# Patient Record
Sex: MALE | Race: WHITE
[De-identification: names, ages, dates, MRNs, and addresses within clinical notes are randomized per-mention and may not be internally consistent; named-entity substitution may affect disease eponyms.]

---

## 2024-02-29 ENCOUNTER — HOSPITAL ENCOUNTER
Dept: HOSPITAL 101 - LAB | Age: 59
Discharge: HOME | End: 2024-02-29
Payer: COMMERCIAL

## 2024-02-29 DIAGNOSIS — Z01.812: Primary | ICD-10-CM

## 2024-02-29 DIAGNOSIS — J32.9: ICD-10-CM

## 2024-02-29 LAB
INR: 1.26
PARTIAL THROMBOPLASTIN TIME: 25.9 SEC (ref 22.3–36.2)
PROTHROMBIN TIME: 13.2 SEC (ref 9–11.6)

## 2024-02-29 PROCEDURE — 36415 COLL VENOUS BLD VENIPUNCTURE: CPT

## 2024-02-29 PROCEDURE — 85610 PROTHROMBIN TIME: CPT

## 2024-02-29 PROCEDURE — 85730 THROMBOPLASTIN TIME PARTIAL: CPT

## 2024-04-05 ENCOUNTER — OFFICE VISIT (OUTPATIENT)
Dept: OTOLARYNGOLOGY | Facility: CLINIC | Age: 59
End: 2024-04-05
Payer: COMMERCIAL

## 2024-04-05 VITALS — TEMPERATURE: 96.5 F | BODY MASS INDEX: 25.76 KG/M2 | WEIGHT: 194.4 LBS | HEIGHT: 73 IN

## 2024-04-05 DIAGNOSIS — J32.1 CHRONIC FRONTAL SINUSITIS: ICD-10-CM

## 2024-04-05 DIAGNOSIS — R09.81 NASAL CONGESTION WITH RHINORRHEA: ICD-10-CM

## 2024-04-05 DIAGNOSIS — J32.2 CHRONIC ETHMOIDAL SINUSITIS: Primary | ICD-10-CM

## 2024-04-05 DIAGNOSIS — J34.2 DEVIATED SEPTUM: ICD-10-CM

## 2024-04-05 DIAGNOSIS — J34.89 NASAL CONGESTION WITH RHINORRHEA: ICD-10-CM

## 2024-04-05 PROCEDURE — 31231 NASAL ENDOSCOPY DX: CPT | Performed by: OTOLARYNGOLOGY

## 2024-04-05 PROCEDURE — 99204 OFFICE O/P NEW MOD 45 MIN: CPT | Performed by: OTOLARYNGOLOGY

## 2024-04-05 RX ORDER — PROPRANOLOL HYDROCHLORIDE 20 MG/1
20 TABLET ORAL
COMMUNITY
Start: 2017-10-23

## 2024-04-05 RX ORDER — ACETAMINOPHEN 500 MG
TABLET ORAL
COMMUNITY

## 2024-04-05 RX ORDER — LISINOPRIL 20 MG/1
20 TABLET ORAL
COMMUNITY
Start: 2016-10-31

## 2024-04-05 RX ORDER — NIFEDIPINE 30 MG/1
30 TABLET, EXTENDED RELEASE ORAL
COMMUNITY
Start: 2016-11-07

## 2024-04-05 RX ORDER — SUMATRIPTAN SUCCINATE 100 MG/1
100 TABLET ORAL
COMMUNITY
Start: 2016-10-31

## 2024-04-05 RX ORDER — BROMPHENIRAMINE MALEATE, DEXTROMETHORPHAN HBR, PHENYLEPHRINE HCL, DIPHENHYDRAMINE HCL, PHENYLEPHRINE HCL 0.52G
KIT ORAL
COMMUNITY
Start: 2017-10-23

## 2024-04-05 ASSESSMENT — PATIENT HEALTH QUESTIONNAIRE - PHQ9
2. FEELING DOWN, DEPRESSED OR HOPELESS: NOT AT ALL
SUM OF ALL RESPONSES TO PHQ9 QUESTIONS 1 AND 2: 0
1. LITTLE INTEREST OR PLEASURE IN DOING THINGS: NOT AT ALL

## 2024-04-05 NOTE — PROGRESS NOTES
Chief Complaint:  Chronic sinusitis     History Of Present Illness:  Reason For Visit:     Dean presents in consultation through Dr. Jung for chronic sinusitis.    The Symptoms Are: intermittent and have been present for years, worse since July 2023.  In the last 12 months, the patient has had 1-2 sinus infections.  In the last 12 months, the patient has been treated with 3-4 antibiotic course(s).  The antibiotics have included Amoxicillin, Levaquin, and Augmentin (and 1-2 rounds of prednisone).     He has had sinus problems on and off as long as he can remember typically with 1 or 2 sinus infections per year in the winter months.  In July 2023 he started to have a significant exacerbation and he has been followed by both his primary care provider and Dr. Jung during this time.  He has been given multiple courses of oral antibiotic therapy as well as topical steroid therapy and oral steroid therapy.  Unfortunately, his symptoms have persisted despite these interventions.  CT imaging was obtained demonstrating findings within his paranasal sinuses.  As he was being worked up for surgery, Dr. 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  He does not feel that he bruises or bleeds particularly easily and he has had previous procedures in the past.  He was asked to follow-up with a different hematologist for further testing and this appointment is pending.    He has also started to have bilateral ear ringing worse since July and it seems to wax and wane getting worse when his nasal congestion progresses.    Main Symptoms:  Patient has anterior nasal drainage.     Patient has  posterior nasal drainage.   causes throat soreness   Patient has nasal airway obstruction.  bilateral, most bothersome symptom  Patient has  facial pain.   most bothersome symptom  Patient has  facial pressure.     Patient has decreased sense of smell. Decreased 30-40 % of normal.   Associated Symptoms:   Patient has  headaches.     Patient has throat clearing.    Patient has coughing.    Patient has dysphonia.   Patient does not have nasal bleeding.     Medications currently on for sinonasal symptoms: Saline irrigations as needed   Medications tried in the past for sinonasal symptoms:  Flonase (used consistently for 5 months)     Other Pertinent Medical Conditions:   Patient does not have asthma.    Patient does not have aspirin sensitivity.    Patient has migraines.   rarely   Patient does not have history of allergy testing.   Patient does not have history of sinus surgery.    Patient does not have history of nasal fracture.    Patient has heartburn.   mild  The patient does not take medical therapy for heartburn.   The patient has imaging of sinuses. When: 1/15/24    The remainder of a full 10 systems review of systems was pan negative outside of that stated in the history of present illness.    Active Problems:  Osteoarthritis, hypertension.     Past Medical History:  He has no past medical history on file.    Surgical History:  History reviewed. No pertinent surgical history.    Family History:  Mother: Heart Failure, Hypertension.  Father: Cancer, Hearing loss.  Brother: Heart Failure     Social History:  Tobacco Use: Low Risk  (4/5/2024)    Patient History     Smoking Tobacco Use: Never     Smokeless Tobacco Use: Never     Passive Exposure: Not on file     Allergies:  Allergies   Allergen Reactions    Quinolones Other and Myalgia     Other Reaction(s): tendon rupture     Current Meds:  Current Outpatient Medications:     cholecalciferol (Vitamin D-3) 5,000 Units tablet, , Disp: , Rfl:     lisinopril 20 mg tablet, 1 tablet (20 mg)., Disp: , Rfl:     multivit with minerals/lutein (MULTIVITAMIN 50 PLUS ORAL), , Disp: , Rfl:     NIFEdipine XL 30 mg 24 hr tablet, 1 tablet (30 mg)., Disp: , Rfl:     propranolol (Inderal)  "20 mg tablet, Take 1 tablet (20 mg) by mouth., Disp: , Rfl:     psyllium (Metamucil) 0.52 gram capsule, Psyllium Husk (Metamucil) 0.52 gram Capsule Active 1 CAP PO As Directed October 23rd, 2017 12:00am, Disp: , Rfl:     SUMAtriptan (Imitrex) 100 mg tablet, Take 1 tablet (100 mg) by mouth., Disp: , Rfl:     Visit Vitals  Temp 35.8 °C (96.5 °F)   Ht 1.854 m (6' 1\")   Wt 88.2 kg (194 lb 6.4 oz)   BMI 25.65 kg/m²   Smoking Status Never   BSA 2.13 m²   Respirations: 12    Physical Exam:  CONSTITUTIONAL:  Vitals reviewed in nursing chart, well developed, well nourished.    RESPIRATION:  Breathing comfortably, no stridor.  CV:  No clubbing/cyanosis/edema in hands.  EYES:  EOM Intact, sclera normal.  NEURO:  Alert and oriented times 3, Cranial nerves 2-12 intact and symmetric bilaterally.  HEAD AND FACE:  Skin with no masses or lesions, sinuses nontender to palpation.  SALIVARY GLANDS:  Parotid and submandibular glands normal bilaterally.  EARS:  Normal external ears, external auditory canals, and TMs to otoscopy, normal hearing to whispered voice.  NOSE:  External nose midline, anterior rhinoscopy is normal with limited visualization to the anterior aspect of the interior turbinates (see nasal endoscopy).  ORAL CAVITY/OROPHARYNX/LIPS:  Normal mucous membranes, normal floor of mouth/tongue/OP, no masses or lesions are noted.  PHARYNGEAL WALLS AND NASOPHARYNX:  No masses noted.  NECK/LYMPH:  No LAD, no thyroid masses.  LARYNX:  Could not direct visualize transorally.    SINONASAL ENDOSCOPY (CPT 54893): To better evaluate the patient's symptoms, sinonasal endoscopy is indicated.  After discussion of risks and benefits, and topical decongestion and anesthesia,an endoscope was used to perform nasal endoscopy on each side.  A time out identifying the patient, the procedure, the location of the procedure and any concerns was performed prior to beginning the procedure.    Findings:  Examination of the right nasal cavity revealed " a significant septal deviation at the level of the base of the middle turbinate.  There was significant impact on his nasal airway.  Middle meatus and sphenoethmoid recess were normal without pus or polyps.  Examination of the left nasal cavity revealed a normal middle meatus and sphenoethmoid recess without pus or polyps.  He has bilateral inferior turbinate hypertrophy.    Results/Data:  I personally reviewed the CT scan dated 1/15/24 with the patient today in clinic. It demonstrated inflammatory changes within a number of sinuses.    I reviewed documentation from his previous ENT and he had been prescribed amoxicillin, Augmentin, fluticasone, Levaquin, prednisone for the symptoms.    Provider Impressions:  1.  Chronic sinusitis   2.  Nasal airway obstruction, deviated nasal septum, bilateral inferior turbinate hypertrophy  3.  Rhinorrhea  4.  Facial pain and pressure, headaches  5.  Decreased sense of smell  6.  Throat clearing, coughing, dysphonia  7.  Tinnitus  8.  Factor VII deficiency    Discussion:  Dean Rueda and I discussed options.  He was comfortable considering surgical options today.  He presents with persistence of symptoms despite adequate medical therapy.  We discussed options including observation, continued medical therapy, or consideration of surgical intervention.  The sinus surgery itself was discussed at length.  The risks, benefits, and alternatives were explained in detail which include but is not limited to: persistence of symptoms, pain, bleeding, smell loss or alteration, infection, need for nasal packing, double vision, vision loss, CSF leak requiring other procedures to repair, numbness of teeth/and or face, need for revision surgery, and unexpected risks.      We discussed septoplasty itself including the risks, benefits and alternatives including but not limited to the following:  Numbness of teeth/and or face, alteration in sense of smell or taste, need for further surgery,  inability to correct nasal obstruction, bleeding, pain, infection, anesthetic risk, septal perforation and unexpected complications.     The risks of bilateral submucous inferior turbinate reductions were discussed and include bleeding in the short term and re-hypertrophy and recurrence of symptoms long-term.  There is a very low risk of scarring associated with this type of procedure.    We spent quite a bit of time today discussing his factor VII deficiency and that I would need an understanding from hematology if there is any medical therapy required to minimize his chances of bleeding either to be administered prior to the procedure, during the procedure, or after the procedure.  If he is not at elevated risk for bleeding I still think it would be in his best interest to have his procedure performed at the main campus.    Once he sees his new hematologist and there are recommendations available, I will schedule him at his convenience.  We did discuss some of his other symptoms such as tinnitus and there is no guarantee that these issues will improve with sinus surgery.  All questions were answered.    Patient Discussion/Summary:  Welcome to Dr. Liam Nowak's clinic. We are here to assist you with your ENT needs at [facility] [place]. Dr. Nowak is an ENT that specializes in nose, sinus, and skull base disorders.    Dr. Liam Nowak's office number is [phone]. Please call this number to contact his care team regardless of which office you use to access care. This number is the most direct way to communicate with all the members of the care team.    Oneyda Romeo CNP is a nurse practitioner who is a part of Dr. Nowak's team. She will work collaboratively with Dr. Nowak to meet your goals. This often may include seeing you for more urgent appointments or follow-up visits under Dr. Nowak's supervision.    Hyun Mejia RN BSN is Dr. Nowak's primary nurse. She can  be reached by calling [phone]. Hyun is available during business hours Tuesday through Friday. Oneyda Mace RN BSN is her rhinology nurse partner. Oneyda is available during business hours Monday through Thursday. Messages left for them will be returned within one business day. Hyun is also Dr. Nowak's surgery scheduler and will assist you with planning and scheduling of your surgery during her office hours.     Suzi Álvarez is Dr. Nowak's  and you can reach her at [phone]. She can help you with scheduling of appointments, general questions and information. She is available to receive calls Monday through Friday from 8:00 am until 4:25 pm.     For your convenience, Dr. Nowak sees patients at [facility] and [facility]. While we try to make your appointments as convenient as possible, occasionally a visit to another location may be necessary to provide the best care for you.    Dr. Nowak makes every effort to run on time for your appointments. Therefore, if you are more than 25 minutes late, your appointment will need to be rescheduled to another day. We appreciate your understanding.     We look forward to working with you to meet your healthcare goals.     Signature:  Scribe Attestation  By signing my name below, I, Viry Paige   attest that this documentation has been prepared under the direction and in the presence of Liam Nowak MD.

## 2024-04-05 NOTE — LETTER
April 7, 2024     Shanna Jung MD  [address] [address]    Patient: Dean Rueda   YOB: 1965   Date of Visit: 4/5/2024       Dear Dr. Shanna Jung MD:    Thank you for referring Dean Rueda to me for evaluation. Below are my notes for this consultation.  If you have questions, please do not hesitate to call me. I look forward to following your patient along with you.       Sincerely,     Liam Nowak MD      CC: No Recipients  ______________________________________________________________________________________    Chief Complaint:  Chronic sinusitis     History Of Present Illness:  Reason For Visit:     Dean presents in consultation through Dr. Jung for chronic sinusitis.    The Symptoms Are: intermittent and have been present for years, worse since July 2023.  In the last 12 months, the patient has had 1-2 sinus infections.  In the last 12 months, the patient has been treated with 3-4 antibiotic course(s).  The antibiotics have included Amoxicillin, Levaquin, and Augmentin (and 1-2 rounds of prednisone).     He has had sinus problems on and off as long as he can remember typically with 1 or 2 sinus infections per year in the winter months.  In July 2023 he started to have a significant exacerbation and he has been followed by both his primary care provider and Dr. Jung during this time.  He has been given multiple courses of oral antibiotic therapy as well as topical steroid therapy and oral steroid therapy.  Unfortunately, his symptoms have persisted despite these interventions.  CT imaging was obtained demonstrating findings within his paranasal sinuses.  As he was being worked up for surgery, Dr. 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  He does not feel that he bruises or bleeds particularly  easily and he has had previous procedures in the past.  He was asked to follow-up with a different hematologist for further testing and this appointment is pending.    He has also started to have bilateral ear ringing worse since July and it seems to wax and wane getting worse when his nasal congestion progresses.    Main Symptoms:  Patient has anterior nasal drainage.     Patient has  posterior nasal drainage.   causes throat soreness   Patient has nasal airway obstruction.  bilateral, most bothersome symptom  Patient has  facial pain.   most bothersome symptom  Patient has  facial pressure.    Patient has decreased sense of smell. Decreased 30-40 % of normal.   Associated Symptoms:   Patient has  headaches.     Patient has throat clearing.    Patient has coughing.    Patient has dysphonia.   Patient does not have nasal bleeding.     Medications currently on for sinonasal symptoms: Saline irrigations as needed   Medications tried in the past for sinonasal symptoms:  Flonase (used consistently for 5 months)     Other Pertinent Medical Conditions:   Patient does not have asthma.    Patient does not have aspirin sensitivity.    Patient has migraines.   rarely   Patient does not have history of allergy testing.   Patient does not have history of sinus surgery.    Patient does not have history of nasal fracture.    Patient has heartburn.   mild  The patient does not take medical therapy for heartburn.   The patient has imaging of sinuses. When: 1/15/24    The remainder of a full 10 systems review of systems was pan negative outside of that stated in the history of present illness.    Active Problems:  Osteoarthritis, hypertension.     Past Medical History:  He has no past medical history on file.    Surgical History:  History reviewed. No pertinent surgical history.    Family History:  Mother: Heart Failure, Hypertension.  Father: Cancer, Hearing loss.  Brother: Heart Failure     Social History:  Tobacco Use: Low Risk   "(4/5/2024)    Patient History    • Smoking Tobacco Use: Never    • Smokeless Tobacco Use: Never    • Passive Exposure: Not on file     Allergies:  Allergies   Allergen Reactions   • Quinolones Other and Myalgia     Other Reaction(s): tendon rupture     Current Meds:  Current Outpatient Medications:   •  cholecalciferol (Vitamin D-3) 5,000 Units tablet, , Disp: , Rfl:   •  lisinopril 20 mg tablet, 1 tablet (20 mg)., Disp: , Rfl:   •  multivit with minerals/lutein (MULTIVITAMIN 50 PLUS ORAL), , Disp: , Rfl:   •  NIFEdipine XL 30 mg 24 hr tablet, 1 tablet (30 mg)., Disp: , Rfl:   •  propranolol (Inderal) 20 mg tablet, Take 1 tablet (20 mg) by mouth., Disp: , Rfl:   •  psyllium (Metamucil) 0.52 gram capsule, Psyllium Husk (Metamucil) 0.52 gram Capsule Active 1 CAP PO As Directed October 23rd, 2017 12:00am, Disp: , Rfl:   •  SUMAtriptan (Imitrex) 100 mg tablet, Take 1 tablet (100 mg) by mouth., Disp: , Rfl:     Visit Vitals  Temp 35.8 °C (96.5 °F)   Ht 1.854 m (6' 1\")   Wt 88.2 kg (194 lb 6.4 oz)   BMI 25.65 kg/m²   Smoking Status Never   BSA 2.13 m²   Respirations: 12    Physical Exam:  CONSTITUTIONAL:  Vitals reviewed in nursing chart, well developed, well nourished.    RESPIRATION:  Breathing comfortably, no stridor.  CV:  No clubbing/cyanosis/edema in hands.  EYES:  EOM Intact, sclera normal.  NEURO:  Alert and oriented times 3, Cranial nerves 2-12 intact and symmetric bilaterally.  HEAD AND FACE:  Skin with no masses or lesions, sinuses nontender to palpation.  SALIVARY GLANDS:  Parotid and submandibular glands normal bilaterally.  EARS:  Normal external ears, external auditory canals, and TMs to otoscopy, normal hearing to whispered voice.  NOSE:  External nose midline, anterior rhinoscopy is normal with limited visualization to the anterior aspect of the interior turbinates (see nasal endoscopy).  ORAL CAVITY/OROPHARYNX/LIPS:  Normal mucous membranes, normal floor of mouth/tongue/OP, no masses or lesions are " noted.  PHARYNGEAL WALLS AND NASOPHARYNX:  No masses noted.  NECK/LYMPH:  No LAD, no thyroid masses.  LARYNX:  Could not direct visualize transorally.    SINONASAL ENDOSCOPY (CPT 89487): To better evaluate the patient's symptoms, sinonasal endoscopy is indicated.  After discussion of risks and benefits, and topical decongestion and anesthesia,an endoscope was used to perform nasal endoscopy on each side.  A time out identifying the patient, the procedure, the location of the procedure and any concerns was performed prior to beginning the procedure.    Findings:  Examination of the right nasal cavity revealed a significant septal deviation at the level of the base of the middle turbinate.  There was significant impact on his nasal airway.  Middle meatus and sphenoethmoid recess were normal without pus or polyps.  Examination of the left nasal cavity revealed a normal middle meatus and sphenoethmoid recess without pus or polyps.  He has bilateral inferior turbinate hypertrophy.    Results/Data:  I personally reviewed the CT scan dated 1/15/24 with the patient today in clinic. It demonstrated inflammatory changes within a number of sinuses.    I reviewed documentation from his previous ENT and he had been prescribed amoxicillin, Augmentin, fluticasone, Levaquin, prednisone for the symptoms.    Provider Impressions:  1.  Chronic sinusitis   2.  Nasal airway obstruction, deviated nasal septum, bilateral inferior turbinate hypertrophy  3.  Rhinorrhea  4.  Facial pain and pressure, headaches  5.  Decreased sense of smell  6.  Throat clearing, coughing, dysphonia  7.  Tinnitus  8.  Factor VII deficiency    Discussion:  Dean Rueda and I discussed options.  He was comfortable considering surgical options today.  He presents with persistence of symptoms despite adequate medical therapy.  We discussed options including observation, continued medical therapy, or consideration of surgical intervention.  The sinus surgery  itself was discussed at length.  The risks, benefits, and alternatives were explained in detail which include but is not limited to: persistence of symptoms, pain, bleeding, smell loss or alteration, infection, need for nasal packing, double vision, vision loss, CSF leak requiring other procedures to repair, numbness of teeth/and or face, need for revision surgery, and unexpected risks.      We discussed septoplasty itself including the risks, benefits and alternatives including but not limited to the following:  Numbness of teeth/and or face, alteration in sense of smell or taste, need for further surgery, inability to correct nasal obstruction, bleeding, pain, infection, anesthetic risk, septal perforation and unexpected complications.     The risks of bilateral submucous inferior turbinate reductions were discussed and include bleeding in the short term and re-hypertrophy and recurrence of symptoms long-term.  There is a very low risk of scarring associated with this type of procedure.    We spent quite a bit of time today discussing his factor VII deficiency and that I would need an understanding from hematology if there is any medical therapy required to minimize his chances of bleeding either to be administered prior to the procedure, during the procedure, or after the procedure.  If he is not at elevated risk for bleeding I still think it would be in his best interest to have his procedure performed at the main campus.    Once he sees his new hematologist and there are recommendations available, I will schedule him at his convenience.  We did discuss some of his other symptoms such as tinnitus and there is no guarantee that these issues will improve with sinus surgery.  All questions were answered.    Patient Discussion/Summary:  Welcome to Dr. Liam Nowak's clinic. We are here to assist you with your ENT needs at [facility] [place]. Dr. Nowak is an ENT that specializes in nose,  sinus, and skull base disorders.    Dr. Liam Nowak's office number is [phone]. Please call this number to contact his care team regardless of which office you use to access care. This number is the most direct way to communicate with all the members of the care team.    Oneyda Romeo CNP is a nurse practitioner who is a part of Dr. Nowak's team. She will work collaboratively with Dr. Nowak to meet your goals. This often may include seeing you for more urgent appointments or follow-up visits under Dr. Nowak's supervision.    Hyun Mejia RN BSN is Dr. Nowak's primary nurse. She can be reached by calling [phone]. Hyun is available during business hours Tuesday through Friday. Oneyda REIDN is her rhinology nurse partner. Oneyda is available during business hours Monday through Thursday. Messages left for them will be returned within one business day. Hyun is also Dr. Nowak's surgery scheduler and will assist you with planning and scheduling of your surgery during her office hours.     Suzi Álvarez is Dr. Nwoak's  and you can reach her at [phone]. She can help you with scheduling of appointments, general questions and information. She is available to receive calls Monday through Friday from 8:00 am until 4:25 pm.     For your convenience, Dr. Nowak sees patients at [facility] and [facility]. While we try to make your appointments as convenient as possible, occasionally a visit to another location may be necessary to provide the best care for you.    Dr. Nowak makes every effort to run on time for your appointments. Therefore, if you are more than 25 minutes late, your appointment will need to be rescheduled to another day. We appreciate your understanding.     We look forward to working with you to meet your healthcare goals.     Signature:  Scribe Attestation  By signing my name below, ICassandra  Viry French   attest that this documentation has been prepared under the direction and in the presence of Liam Nowak MD.

## 2024-04-05 NOTE — LETTER
April 7, 2024     Shanna Jung MD  [address] [address]    Patient: Dean Rueda   YOB: 1965   Date of Visit: 4/5/2024       Dear Dr. Shanna Jung MD:    Thank you for referring Dean Rueda to me for evaluation. Below are my notes for this consultation.  If you have questions, please do not hesitate to call me. I look forward to following your patient along with you.       Sincerely,     Liam Nowak MD      CC: No Recipients  ______________________________________________________________________________________    Chief Complaint:  Chronic sinusitis     History Of Present Illness:  Reason For Visit:     Dean presents in consultation through Dr. Jung for chronic sinusitis.    The Symptoms Are: intermittent and have been present for years, worse since July 2023.  In the last 12 months, the patient has had 1-2 sinus infections.  In the last 12 months, the patient has been treated with 3-4 antibiotic course(s).  The antibiotics have included Amoxicillin, Levaquin, and Augmentin (and 1-2 rounds of prednisone).     He has had sinus problems on and off as long as he can remember typically with 1 or 2 sinus infections per year in the winter months.  In July 2023 he started to have a significant exacerbation and he has been followed by both his primary care provider and Dr. Jung during this time.  He has been given multiple courses of oral antibiotic therapy as well as topical steroid therapy and oral steroid therapy.  Unfortunately, his symptoms have persisted despite these interventions.  CT imaging was obtained demonstrating findings within his paranasal sinuses.  As he was being worked up for surgery, Dr. 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  He does not feel that he bruises or bleeds particularly  easily and he has had previous procedures in the past.  He was asked to follow-up with a different hematologist for further testing and this appointment is pending.    He has also started to have bilateral ear ringing worse since July and it seems to wax and wane getting worse when his nasal congestion progresses.    Main Symptoms:  Patient has anterior nasal drainage.     Patient has  posterior nasal drainage.   causes throat soreness   Patient has nasal airway obstruction.  bilateral, most bothersome symptom  Patient has  facial pain.   most bothersome symptom  Patient has  facial pressure.    Patient has decreased sense of smell. Decreased 30-40 % of normal.   Associated Symptoms:   Patient has  headaches.     Patient has throat clearing.    Patient has coughing.    Patient has dysphonia.   Patient does not have nasal bleeding.     Medications currently on for sinonasal symptoms: Saline irrigations as needed   Medications tried in the past for sinonasal symptoms:  Flonase (used consistently for 5 months)     Other Pertinent Medical Conditions:   Patient does not have asthma.    Patient does not have aspirin sensitivity.    Patient has migraines.   rarely   Patient does not have history of allergy testing.   Patient does not have history of sinus surgery.    Patient does not have history of nasal fracture.    Patient has heartburn.   mild  The patient does not take medical therapy for heartburn.   The patient has imaging of sinuses. When: 1/15/24    The remainder of a full 10 systems review of systems was pan negative outside of that stated in the history of present illness.    Active Problems:  Osteoarthritis, hypertension.     Past Medical History:  He has no past medical history on file.    Surgical History:  History reviewed. No pertinent surgical history.    Family History:  Mother: Heart Failure, Hypertension.  Father: Cancer, Hearing loss.  Brother: Heart Failure     Social History:  Tobacco Use: Low Risk   "(4/5/2024)    Patient History    • Smoking Tobacco Use: Never    • Smokeless Tobacco Use: Never    • Passive Exposure: Not on file     Allergies:  Allergies   Allergen Reactions   • Quinolones Other and Myalgia     Other Reaction(s): tendon rupture     Current Meds:  Current Outpatient Medications:   •  cholecalciferol (Vitamin D-3) 5,000 Units tablet, , Disp: , Rfl:   •  lisinopril 20 mg tablet, 1 tablet (20 mg)., Disp: , Rfl:   •  multivit with minerals/lutein (MULTIVITAMIN 50 PLUS ORAL), , Disp: , Rfl:   •  NIFEdipine XL 30 mg 24 hr tablet, 1 tablet (30 mg)., Disp: , Rfl:   •  propranolol (Inderal) 20 mg tablet, Take 1 tablet (20 mg) by mouth., Disp: , Rfl:   •  psyllium (Metamucil) 0.52 gram capsule, Psyllium Husk (Metamucil) 0.52 gram Capsule Active 1 CAP PO As Directed October 23rd, 2017 12:00am, Disp: , Rfl:   •  SUMAtriptan (Imitrex) 100 mg tablet, Take 1 tablet (100 mg) by mouth., Disp: , Rfl:     Visit Vitals  Temp 35.8 °C (96.5 °F)   Ht 1.854 m (6' 1\")   Wt 88.2 kg (194 lb 6.4 oz)   BMI 25.65 kg/m²   Smoking Status Never   BSA 2.13 m²   Respirations: 12    Physical Exam:  CONSTITUTIONAL:  Vitals reviewed in nursing chart, well developed, well nourished.    RESPIRATION:  Breathing comfortably, no stridor.  CV:  No clubbing/cyanosis/edema in hands.  EYES:  EOM Intact, sclera normal.  NEURO:  Alert and oriented times 3, Cranial nerves 2-12 intact and symmetric bilaterally.  HEAD AND FACE:  Skin with no masses or lesions, sinuses nontender to palpation.  SALIVARY GLANDS:  Parotid and submandibular glands normal bilaterally.  EARS:  Normal external ears, external auditory canals, and TMs to otoscopy, normal hearing to whispered voice.  NOSE:  External nose midline, anterior rhinoscopy is normal with limited visualization to the anterior aspect of the interior turbinates (see nasal endoscopy).  ORAL CAVITY/OROPHARYNX/LIPS:  Normal mucous membranes, normal floor of mouth/tongue/OP, no masses or lesions are " noted.  PHARYNGEAL WALLS AND NASOPHARYNX:  No masses noted.  NECK/LYMPH:  No LAD, no thyroid masses.  LARYNX:  Could not direct visualize transorally.    SINONASAL ENDOSCOPY (CPT 63930): To better evaluate the patient's symptoms, sinonasal endoscopy is indicated.  After discussion of risks and benefits, and topical decongestion and anesthesia,an endoscope was used to perform nasal endoscopy on each side.  A time out identifying the patient, the procedure, the location of the procedure and any concerns was performed prior to beginning the procedure.    Findings:  Examination of the right nasal cavity revealed a significant septal deviation at the level of the base of the middle turbinate.  There was significant impact on his nasal airway.  Middle meatus and sphenoethmoid recess were normal without pus or polyps.  Examination of the left nasal cavity revealed a normal middle meatus and sphenoethmoid recess without pus or polyps.  He has bilateral inferior turbinate hypertrophy.    Results/Data:  I personally reviewed the CT scan dated 1/15/24 with the patient today in clinic. It demonstrated inflammatory changes within a number of sinuses.    I reviewed documentation from his previous ENT and he had been prescribed amoxicillin, Augmentin, fluticasone, Levaquin, prednisone for the symptoms.    Provider Impressions:  1.  Chronic sinusitis   2.  Nasal airway obstruction, deviated nasal septum, bilateral inferior turbinate hypertrophy  3.  Rhinorrhea  4.  Facial pain and pressure, headaches  5.  Decreased sense of smell  6.  Throat clearing, coughing, dysphonia  7.  Tinnitus  8.  Factor VII deficiency    Discussion:  Dean Rueda and I discussed options.  He was comfortable considering surgical options today.  He presents with persistence of symptoms despite adequate medical therapy.  We discussed options including observation, continued medical therapy, or consideration of surgical intervention.  The sinus surgery  itself was discussed at length.  The risks, benefits, and alternatives were explained in detail which include but is not limited to: persistence of symptoms, pain, bleeding, smell loss or alteration, infection, need for nasal packing, double vision, vision loss, CSF leak requiring other procedures to repair, numbness of teeth/and or face, need for revision surgery, and unexpected risks.      We discussed septoplasty itself including the risks, benefits and alternatives including but not limited to the following:  Numbness of teeth/and or face, alteration in sense of smell or taste, need for further surgery, inability to correct nasal obstruction, bleeding, pain, infection, anesthetic risk, septal perforation and unexpected complications.     The risks of bilateral submucous inferior turbinate reductions were discussed and include bleeding in the short term and re-hypertrophy and recurrence of symptoms long-term.  There is a very low risk of scarring associated with this type of procedure.    We spent quite a bit of time today discussing his factor VII deficiency and that I would need an understanding from hematology if there is any medical therapy required to minimize his chances of bleeding either to be administered prior to the procedure, during the procedure, or after the procedure.  If he is not at elevated risk for bleeding I still think it would be in his best interest to have his procedure performed at the main campus.    Once he sees his new hematologist and there are recommendations available, I will schedule him at his convenience.  We did discuss some of his other symptoms such as tinnitus and there is no guarantee that these issues will improve with sinus surgery.  All questions were answered.    Patient Discussion/Summary:  Welcome to Dr. Liam Nowak's clinic. We are here to assist you with your ENT needs at [facility] [place]. Dr. Nowak is an ENT that specializes in nose,  sinus, and skull base disorders.    Dr. Liam Nowak's office number is [phone]. Please call this number to contact his care team regardless of which office you use to access care. This number is the most direct way to communicate with all the members of the care team.    Oneyda Romeo CNP is a nurse practitioner who is a part of Dr. Nowak's team. She will work collaboratively with Dr. Nowak to meet your goals. This often may include seeing you for more urgent appointments or follow-up visits under Dr. Nowak's supervision.    Hyun Mejia RN BSN is Dr. Nowak's primary nurse. She can be reached by calling [phone]. Hyun is available during business hours Tuesday through Friday. Oneyda REIDN is her rhinology nurse partner. Oneyda is available during business hours Monday through Thursday. Messages left for them will be returned within one business day. Hyun is also Dr. Nowak's surgery scheduler and will assist you with planning and scheduling of your surgery during her office hours.     Suzi Álvarez is Dr. Nowak's  and you can reach her at [phone]. She can help you with scheduling of appointments, general questions and information. She is available to receive calls Monday through Friday from 8:00 am until 4:25 pm.     For your convenience, Dr. Nowak sees patients at [facility] and [facility] at [facility]. While we try to make your appointments as convenient as possible, occasionally a visit to another location may be necessary to provide the best care for you.    Dr. Nowak makes every effort to run on time for your appointments. Therefore, if you are more than 25 minutes late, your appointment will need to be rescheduled to another day. We appreciate your understanding.     We look forward to working with you to meet your healthcare goals.     Signature:  Scribe Attestation  By signing my name below, ICassandra  Viry French   attest that this documentation has been prepared under the direction and in the presence of Liam Nowak MD.

## 2024-04-05 NOTE — LETTER
April 5, 2024     Shanna Jung MD  [address] [address]    Patient: Dean Rueda   YOB: 1965   Date of Visit: 4/5/2024       Dear Dr. Shanna Jung MD:    Thank you for referring Dean Rueda to me for evaluation. Below are my notes for this consultation.  If you have questions, please do not hesitate to call me. I look forward to following your patient along with you.       Sincerely,     Liam Nowak MD      CC: No Recipients  ______________________________________________________________________________________    Chief Complaint:  Chronic sinusitis     History Of Present Illness:  Reason For Visit:   Patient presents to Otolaryngology Clinic in consultation at request of Dr. Shanna Jung for chronic sinusitis.    The Symptoms Are: intermittent and have been present for years, worse since July 2023.  In the last 12 months, the patient has had 1-2 sinus infections.  In the last 12 months, the patient has been treated with 3-4 antibiotic course(s).  The antibiotics have included Amoxicillin, Levaquin, and Augmentin (and 1-2 rounds of prednisone).     Dean Rueda presents to me as a new patient in consultation through Dr. Shanna Jung for chronic sinusitis.     He has had sinus problems off and on as long as he can remember with 1-2 sinus infections yearly typically in the winter months. In July of 2023, he started to have an obvious sinonasal exacerbation. He followed with his PCP and was given multiple rounds of antibiotics, his symptoms did not improve. He was referred to Dr. Jung who advised him to complete a month of antibiotics, he was also started on Flonase at that time. At his follow-up his symptoms persisted and a CT Scan was ordered which demonstrated chronic sinusitis.     He was scheduled for surgery through Dr. Jung, but was found to have an elevated prothrombin level. He followed with a hematologist at [facility] who ran extensive labs. 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 He note he's asymptomatic, he's never had an issue with bruising or bleeding. He spoke with his mother whom noted some family members. He has an appointment with hematology here in [place] in the near future.     He has started to have bilateral tinnitus since his exacerbation in July, worse when his congestion is more significant.     Main Symptoms:  Patient has anterior nasal drainage.     Patient has  posterior nasal drainage.   causes throat soreness   Patient has nasal airway obstruction.  bilateral, most bothersome symptom  Patient has  facial pain.   most bothersome symptom  Patient has  facial pressure.    Patient has decreased sense of smell. Decreased 30-40 % of normal.   Associated Symptoms:   Patient has  headaches.     Patient has throat clearing.    Patient has coughing.    Patient has dysphonia.   Patient does not have nasal bleeding.     Medications currently on for sinonasal symptoms: saline irrigations as needed   Medications tried in the past for sinonasal symptoms:  Flonase (used consistently for 5 months)     Other Pertinent Medical Conditions:   Patient does not have asthma.    Patient does not have aspirin sensitivity.    Patient has migraines.   rarely   Patient does not have history of allergy testing.   Patient does not have history of sinus surgery.    Patient does not have history of nasal fracture.    Patient has heartburn.   mild  The patient does not take medical therapy for heartburn.   The patient has imaging of sinuses. When: 1/15/24    Active Problems:  Osteoarthritis, hypertension.     Past Medical History:  He has no past medical history on file.    Surgical History:  History reviewed. No pertinent surgical history.    Family History:  Mother: Heart Failure, Hypertension.  Father: Cancer, Hearing loss.  Brother: Heart Failure     Social History:  Tobacco Use: Low Risk  (4/5/2024)     "Patient History    • Smoking Tobacco Use: Never    • Smokeless Tobacco Use: Never    • Passive Exposure: Not on file     Allergies:  Allergies   Allergen Reactions   • Quinolones Other and Myalgia     Other Reaction(s): tendon rupture     Current Meds:  Current Outpatient Medications:   •  cholecalciferol (Vitamin D-3) 5,000 Units tablet, , Disp: , Rfl:   •  lisinopril 20 mg tablet, 1 tablet (20 mg)., Disp: , Rfl:   •  multivit with minerals/lutein (MULTIVITAMIN 50 PLUS ORAL), , Disp: , Rfl:   •  NIFEdipine XL 30 mg 24 hr tablet, 1 tablet (30 mg)., Disp: , Rfl:   •  propranolol (Inderal) 20 mg tablet, Take 1 tablet (20 mg) by mouth., Disp: , Rfl:   •  psyllium (Metamucil) 0.52 gram capsule, Psyllium Husk (Metamucil) 0.52 gram Capsule Active 1 CAP PO As Directed October 23rd, 2017 12:00am, Disp: , Rfl:   •  SUMAtriptan (Imitrex) 100 mg tablet, Take 1 tablet (100 mg) by mouth., Disp: , Rfl:     Visit Vitals  Temp 35.8 °C (96.5 °F)   Ht 1.854 m (6' 1\")   Wt 88.2 kg (194 lb 6.4 oz)   BMI 25.65 kg/m²   Smoking Status Never   BSA 2.13 m²     Physical Exam:  CONSTITUTIONAL: Vitals reviewed in nursing chart, well developed, well nourished.   RESPIRATION: Breathing comfortably, no stridor.  CV: No clubbing/cyanosis/edema in hands.  EYES: EOM Intact, sclera normal.  NEURO: Alert and oriented times 3, Cranial nerves 2-12 intact and symmetric bilaterally.  HEAD AND FACE: Skin with no masses or lesions, sinuses nontender to palpation.  SALIVARY GLANDS: Parotid and submandibular glands normal bilaterally.  EARS: Normal external ears, external auditory canals, and TMs to otoscopy, normal hearing to whispered voice.  NOSE: External nose midline, anterior rhinoscopy is normal with limited visualization to the anterior aspect of the interior turbinates (see nasal endoscopy).  ORAL CAVITY/OROPHARYNX/LIPS: Normal mucous membranes, normal floor of mouth/tongue/OP, no masses or lesions are noted.  NECK/LYMPH: No LAD, no thyroid " masses.    SINONASAL ENDOSCOPY (CPT 83680): To better evaluate the patient's symptoms, sinonasal endoscopy is indicated.  After discussion of risks and benefits, and topical decongestion and anesthesia,an endoscope was used to perform nasal endoscopy on each side.  A time out identifying the patient, the procedure, the location of the procedure and any concerns was performed prior to beginning the procedure.    Findings:  Right: Significant septal deviation beginning at the base of the middle turbinate, no evidence of pus or polyps at MM or SER  Left: MM + SER normal  Bilateral inferior turbinate hypertrophy     Results/Data:  I personally reviewed the CT scan dated 1/15/24 with the patient today in clinic. It demonstrated ***.    4/10 sinus disease    Provider Impressions:  1. Chronic sinusitis   2. Rhinorrhea  3. Nasal airway obstruction  4. Facial pain / pressure, headaches, migraines   5. Decreased sense of smell   6. Throat clearing, coughing, dysphonia   7. Heartburn  8. Bilateral tinnitus  9. Factor 7 deficiency   10. Deviated nasal septum    Discussion: Dean Rueda and I discussed ***     Planning to follow with hematology, all hinges on that visit, advised to contact the clinic after he's had this visit   Will then plan to schedule visit at either satellite or main     He was amenable to this and all questions were answered.     Patient Discussion/Summary:  Welcome to Dr. Liam Nowak's clinic. We are here to assist you with your ENT needs at [facility] [place]. Dr. Nowak is an ENT that specializes in nose, sinus, and skull base disorders.    Dr. Liam Nowak's office number is [phone]. Please call this number to contact his care team regardless of which office you use to access care. This number is the most direct way to communicate with all the members of the care team.    Oneyda Romeo CNP is a nurse practitioner who is a part of Dr. Nowak's team. She will  work collaboratively with Dr. Nowak to meet your goals. This often may include seeing you for more urgent appointments or follow-up visits under Dr. Nowak's supervision.    Hyun Mejia RN BSN is Dr. Nowak's primary nurse. She can be reached by calling [phone]. Hyun is available during business hours Tuesday through Friday. Oneyda Mace RN BSN is her rhinology nurse partner. Oneyda is available during business hours Monday through Thursday. Messages left for them will be returned within one business day. Hyun is also Dr. Nowak's surgery scheduler and will assist you with planning and scheduling of your surgery during her office hours.     Suzi Álvarez is Dr. Nowak's  and you can reach her at [phone]. She can help you with scheduling of appointments, general questions and information. She is available to receive calls Monday through Friday from 8:00 am until 4:25 pm.     For your convenience, Dr. Nowak sees patients at [facility] and [facility] at [facility]. While we try to make your appointments as convenient as possible, occasionally a visit to another location may be necessary to provide the best care for you.    Dr. Nowak makes every effort to run on time for your appointments. Therefore, if you are more than 25 minutes late, your appointment will need to be rescheduled to another day. We appreciate your understanding.     We look forward to working with you to meet your healthcare goals.     Signature:  Scribe Attestation  By signing my name below, ICassandra Scribe   attest that this documentation has been prepared under the direction and in the presence of Liam Nowak MD.

## 2024-04-08 ENCOUNTER — TELEPHONE (OUTPATIENT)
Dept: HEMATOLOGY/ONCOLOGY | Facility: HOSPITAL | Age: 59
End: 2024-04-08
Payer: COMMERCIAL

## 2024-04-08 DIAGNOSIS — D68.2 FACTOR VII DEFICIENCY (MULTI): Primary | ICD-10-CM

## 2024-04-08 NOTE — TELEPHONE ENCOUNTER
RN received referral from Cely Chowdhury from [facility]. Referral is from Dr. Bryson for Factor VII deficiency. Patient also needs sinus surgery and will need to be evaluated prior to that surgery. Referral discussed with Dr. Oconnor. Scheduling orders entered.

## 2024-04-15 ENCOUNTER — PATIENT OUTREACH (OUTPATIENT)
Dept: HEMATOLOGY/ONCOLOGY | Facility: HOSPITAL | Age: 59
End: 2024-04-15
Payer: COMMERCIAL

## 2024-04-15 NOTE — PROGRESS NOTES
RN talked to patient. Patient was referred by a Dr. Bryson, Viki Stacy M.D in [place] for factor VII deficiency. Patient also needs sinus surgery and he needs surgical clearance for this. Dr. Liam Stanley is the ENT surgeon. Patient aware of date, time and place of appointment. Patient has phone number to call for questions or concerns. Call back number reviewed.

## 2024-04-16 ENCOUNTER — LAB (OUTPATIENT)
Dept: LAB | Facility: HOSPITAL | Age: 59
End: 2024-04-16
Payer: COMMERCIAL

## 2024-04-16 ENCOUNTER — OFFICE VISIT (OUTPATIENT)
Dept: HEMATOLOGY/ONCOLOGY | Facility: HOSPITAL | Age: 59
End: 2024-04-16
Payer: COMMERCIAL

## 2024-04-16 VITALS
OXYGEN SATURATION: 98 % | BODY MASS INDEX: 26.47 KG/M2 | WEIGHT: 189.1 LBS | SYSTOLIC BLOOD PRESSURE: 130 MMHG | HEART RATE: 61 BPM | DIASTOLIC BLOOD PRESSURE: 92 MMHG | HEIGHT: 71 IN | TEMPERATURE: 97.2 F | RESPIRATION RATE: 16 BRPM

## 2024-04-16 DIAGNOSIS — D68.9 COAGULATION DISORDER (MULTI): ICD-10-CM

## 2024-04-16 DIAGNOSIS — D68.2 FACTOR VII DEFICIENCY (MULTI): ICD-10-CM

## 2024-04-16 LAB
APTT PPP: 29 SECONDS (ref 27–38)
INR PPP: 1.3 (ref 0.9–1.1)
PROTHROMBIN TIME: 14.6 SECONDS (ref 9.8–12.8)

## 2024-04-16 PROCEDURE — 85610 PROTHROMBIN TIME: CPT

## 2024-04-16 PROCEDURE — 85230 CLOT FACTOR VII PROCONVERTIN: CPT

## 2024-04-16 PROCEDURE — 1036F TOBACCO NON-USER: CPT | Performed by: INTERNAL MEDICINE

## 2024-04-16 PROCEDURE — 36415 COLL VENOUS BLD VENIPUNCTURE: CPT

## 2024-04-16 PROCEDURE — 99215 OFFICE O/P EST HI 40 MIN: CPT | Performed by: INTERNAL MEDICINE

## 2024-04-16 PROCEDURE — 99205 OFFICE O/P NEW HI 60 MIN: CPT | Performed by: INTERNAL MEDICINE

## 2024-04-16 ASSESSMENT — COLUMBIA-SUICIDE SEVERITY RATING SCALE - C-SSRS
6. HAVE YOU EVER DONE ANYTHING, STARTED TO DO ANYTHING, OR PREPARED TO DO ANYTHING TO END YOUR LIFE?: NO
1. IN THE PAST MONTH, HAVE YOU WISHED YOU WERE DEAD OR WISHED YOU COULD GO TO SLEEP AND NOT WAKE UP?: NO
2. HAVE YOU ACTUALLY HAD ANY THOUGHTS OF KILLING YOURSELF?: NO

## 2024-04-16 ASSESSMENT — PATIENT HEALTH QUESTIONNAIRE - PHQ9
1. LITTLE INTEREST OR PLEASURE IN DOING THINGS: NOT AT ALL
2. FEELING DOWN, DEPRESSED OR HOPELESS: NOT AT ALL
SUM OF ALL RESPONSES TO PHQ9 QUESTIONS 1 AND 2: 0

## 2024-04-16 ASSESSMENT — ENCOUNTER SYMPTOMS: DEPRESSION: 0

## 2024-04-16 ASSESSMENT — PAIN SCALES - GENERAL: PAINLEVEL: 4

## 2024-04-16 NOTE — PROGRESS NOTES
Hematology office visit      Reason for visit : Factor VII deficiency     ENT : Dr. Liam Nowak -    Hematologist : Dr. Bryson - [facility]     HPI:     Referred by Dr. Charlie Rader from [place] for a factor VII deficiency disorder. He has medical history significant for chronic osteoarthritis , migraines, Gilbert's syndrome and chronic sinusitis . Had been evaluated by ENT Dr. Jung and was recommended ethmoidectomy with septoplasty . Preop labs showed isolated elevated PT prompting referral to Hematology .   He was seen by Dr. Gopi Rader at [facility] and had repaet etsting with factor VII levels which returned low at 28% and hence hje was referred to Dr. Oconnor for periop factor reaplcement . ENT urgeon also preferred that his surghery takes place  at [facility] so he was seen by Dr. Liam Nowak  who wanted periop recommendation for factor replacement .     As far as his bleeding history , he has never had any  issues with bleeding or bruising spontaneously . He had had two laceration in his hands needing suturing ad also lip laceration , all of these were uneventful. He has had wisdom teeth extraction and fissurectomy and anal sphincterotomy also with no bleeding. Unsure of bleeding history in family . Dad may have had some bleeding internally was anaemia , maternal uncle  great grandmother also with some bleeding disorder  undiagnosed . His son has no bleeding issues either .     He wondered if this disorder was congenital or acquired . He also wondered why he had no signs of bleeding if he was deficient. We briefly discussed how there are compensatory mechanisms in our body to prevent bleeding and hat Factor VII is only one of the essential clotting factors and if rest are normal he may not have any bleeding manifestations . As far as replacement therapy , that would depend on type of surgery and how much is factor activity is  . We discussed repeating all the testing  here today to document his factor VII activity level       LABS FROM OSH     Initial - PT 13.1 , aPTT 25.9   Repeat PT- 15.2 , mixing study corrected to 11.2 & then  10.0 . Fibrinogen 230, Factor X 128 , Factor VII - 28, Factor IX 10-2     Social History     Socioeconomic History    Marital status: Unknown     Spouse name: Not on file    Number of children: Not on file    Years of education: Not on file    Highest education level: Not on file   Occupational History    Not on file   Tobacco Use    Smoking status: Never    Smokeless tobacco: Never   Substance and Sexual Activity    Alcohol use: Not on file    Drug use: Not on file    Sexual activity: Not on file   Other Topics Concern    Not on file   Social History Narrative    Not on file     Social Determinants of Health     Financial Resource Strain: Not on file   Food Insecurity: Not on file   Transportation Needs: Not on file   Physical Activity: Not on file   Stress: Not on file   Social Connections: Not on file   Intimate Partner Violence: Not on file   Housing Stability: Not on file         Allergies   Allergen Reactions    Quinolones Other and Myalgia     Other Reaction(s): tendon rupture       Medications  Current Outpatient Medications   Medication Sig Dispense Refill    cholecalciferol (Vitamin D-3) 5,000 Units tablet       lisinopril 20 mg tablet 1 tablet (20 mg).      multivit with minerals/lutein (MULTIVITAMIN 50 PLUS ORAL)       NIFEdipine XL 30 mg 24 hr tablet 1 tablet (30 mg).      propranolol (Inderal) 20 mg tablet Take 1 tablet (20 mg) by mouth.      psyllium (Metamucil) 0.52 gram capsule Psyllium Husk (Metamucil) 0.52 gram Capsule Active 1 CAP PO As Directed October 23rd, 2017 12:00am      SUMAtriptan (Imitrex) 100 mg tablet Take 1 tablet (100 mg) by mouth.       No current facility-administered medications for this visit.         Review of Systems    Constitutional: Negative for fever, chills, anorexia, weight loss, malaise     ENMT: Negative  for nasal discharge, congestion, ear pain, mouth pain, throat pain, bleeding      Respiratory: Negative for cough, hemoptysis, wheezing, shortness of breath     Cardiac: Negative for chest pain, dyspnea on exertion, orthopnea, palpitations, syncope     Gastrointestinal: Negative for nausea, vomiting, diarrhea, constipation, abdominal pain,     Genitourinary: Negative for discharge, dysuria, flank pain, frequency, hematuria     Musculoskeletal: Negative for decreased ROM, pain, swelling, stiffness     Neurological: Negative for dizziness, confusion, headache, seizures, syncope, weakness     Psychiatric: Negative for mood changes, anxiety, hallucinations, sleep changes, suicidal ideas     Skin: Negative for itching, rash, ulcer     Endocrine: Negative for heat intolerance, cold intolerance, excessive sweating, polyuria, excess thirst     Hematologic/Lymph: Negative for anemia, bruising, easy bleeding, night sweats, petechiae, history of DVT/PE or cancer     Allergic/Immunologic: Negative for anaphylaxis, itchy/ teary eyes, itching, sneezing, swelling     Physical Exam:  Vitals:    04/16/24 1108   BP: (!) 130/92   Pulse: 61   Resp: 16   Temp: 36.2 °C (97.2 °F)   SpO2: 98%        General: Comfortable , no acute distress  Eyes: No conjunctival pallor / no scleral icterus   ENT: Oropharynx normal. No bleeding, petechiae   Cardiovascular: Regular rate & rhythm , S1/S2, no murmurs, peripheral  pulses +2, no edema of extremities  Pulmonary: CTAB, no respiratory distress. No wheezes, rales, or ronchi  Abdomen: +BS, soft, non-tender, no hepatosplenomegaly   MSK: No joint swelling, normal movements of all extremities. Range of motion- normal.  Extremities: No wounds, or contusions  Lymphatics: No cervical, axillary , epitrochlear or inguinal  lymphadenopathy   Skin- No rash, petechia or ecchymosis   Neuro: Alert/oriented x3, no focal motor or sensory deficits  Psychiatric: Judgment intact. Appropriate mood and  behavior      Assessment/Plan:    Dean Rueda is a 58 y.o. male with pmhx of  chronic sinusitis  presenting for  preop evaluation for isolated prolonged PT & Factor VII deficiency     04/16/24   Isolated prolonged PT is indicative of a factor VII deficiency .     It is unclear if he has inherited or acquired Factor VII as no clear documented family history and this is an Autosomal recessive  disorder .  Factor VII deficiency can presents with varying degrees of clinical severity  , however  bleeding seldom occurs as long as Factor VII activity is > 10% .  Replacement of factor is usually unnecessary unless bleeding is severe . We can use recombinant Factor VII , rVIIa or if that is unavailable aPCC is an alternate . Since factor VII has very short half life , large and frequent FFP administration may be needed causing volume overload .   Minor surgeries and day surgery may still be possible as long  as Factor levels > 10% without any need for replacement  . If there si concern for severe bleed prefer to use rVIIa   If there is concern for bleeding topical tranexamic acid may be used or systemic oral TXA 1300 mg po tid for 3 days can be given post op.     PLAN   Coag screen and factor VII today   If Factor VII  level > 10%  and depending on the bleeding phenotype, can proceed with surgery without need for replacement. Given that he has no bleeding issues, he should be able to undergo this surgery without needing factor replacement  We will communicate with his ENT surgeon Dr. Nowak and he mu be able to follow up with local hematologist for future needs   Follow up in  2 weeks with phone visit to review labs and make a pan     Seen and staffed with Dr. Oconnor  who is in agreement with the plan .    Dr. Stephy Cedillo MD FACP  PGY-6,  Hematology & Oncology Fellow   [facility]  [id number] [place] [id number] [phone]

## 2024-04-17 LAB — FACT VII ACT/NOR PPP: 31 % (ref 50–150)

## 2024-04-24 DIAGNOSIS — J32.2 CHRONIC ETHMOIDAL SINUSITIS: ICD-10-CM

## 2024-04-24 DIAGNOSIS — J34.2 DEVIATED NASAL SEPTUM: ICD-10-CM

## 2024-04-30 ENCOUNTER — TELEMEDICINE (OUTPATIENT)
Dept: HEMATOLOGY/ONCOLOGY | Facility: HOSPITAL | Age: 59
End: 2024-04-30
Payer: COMMERCIAL

## 2024-04-30 ENCOUNTER — TELEMEDICINE CLINICAL SUPPORT (OUTPATIENT)
Dept: PREADMISSION TESTING | Facility: HOSPITAL | Age: 59
End: 2024-04-30
Payer: COMMERCIAL

## 2024-04-30 DIAGNOSIS — D68.2 FACTOR VII DEFICIENCY (MULTI): ICD-10-CM

## 2024-04-30 DIAGNOSIS — D68.9 COAGULATION DISORDER (MULTI): ICD-10-CM

## 2024-04-30 PROCEDURE — 1036F TOBACCO NON-USER: CPT | Performed by: INTERNAL MEDICINE

## 2024-04-30 PROCEDURE — 99214 OFFICE O/P EST MOD 30 MIN: CPT | Performed by: INTERNAL MEDICINE

## 2024-04-30 RX ORDER — FERROUS SULFATE 325(65) MG
325 TABLET ORAL
COMMUNITY

## 2024-04-30 ASSESSMENT — ENCOUNTER SYMPTOMS
NEUROLOGICAL NEGATIVE: 1
CARDIOVASCULAR NEGATIVE: 1
MYALGIAS: 1
SINUS CONGESTION: 1
NECK NEGATIVE: 1
LIMITED RANGE OF MOTION: 1
RESPIRATORY NEGATIVE: 1
GASTROINTESTINAL NEGATIVE: 1
ENDOCRINE NEGATIVE: 1
EYES NEGATIVE: 1
RHINORRHEA: 1
CONSTITUTIONAL NEGATIVE: 1

## 2024-04-30 ASSESSMENT — DUKE ACTIVITY SCORE INDEX (DASI)
CAN YOU PARTICIPATE IN STRENOUS SPORTS LIKE SWIMMING, SINGLES TENNIS, FOOTBALL, BASKETBALL, OR SKIING: YES
CAN YOU WALK INDOORS, SUCH AS AROUND YOUR HOUSE: YES
CAN YOU DO YARD WORK LIKE RAKING LEAVES, WEEDING OR PUSHING A MOWER: YES
CAN YOU DO MODERATE WORK AROUND THE HOUSE LIKE VACUUMING, SWEEPING FLOORS OR CARRYING GROCERIES: YES
CAN YOU CLIMB A FLIGHT OF STAIRS OR WALK UP A HILL: YES
CAN YOU HAVE SEXUAL RELATIONS: NO
DASI METS SCORE: 9.2
CAN YOU PARTICIPATE IN MODERATE RECREATIONAL ACTIVITIES LIKE GOLF, BOWLING, DANCING, DOUBLES TENNIS OR THROWING A BASEBALL OR FOOTBALL: YES
TOTAL_SCORE: 52.95
CAN YOU DO LIGHT WORK AROUND THE HOUSE LIKE DUSTING OR WASHING DISHES: YES
CAN YOU DO HEAVY WORK AROUND THE HOUSE LIKE SCRUBBING FLOORS OR LIFTING AND MOVING HEAVY FURNITURE: YES
CAN YOU RUN A SHORT DISTANCE: YES
CAN YOU TAKE CARE OF YOURSELF (EAT, DRESS, BATHE, OR USE TOILET): YES
CAN YOU WALK A BLOCK OR TWO ON LEVEL GROUND: YES

## 2024-04-30 NOTE — CPM/PAT NURSE NOTE
CPM/PAT Nurse Note      Name: Dean Rueda (Dean Rueda)  /Age: [birth year]//58 y.o.       Bilateral endoscopic sinus surgery,septoplasty, bilateral submucosal inferior turbinate reductions. Liam Nowak MD on 5/10/2024  PCP Shemar Briones ([facility]) 3/27  Hem Onc Tia Oconnor ()  Given that he has no bleeding issues, he should be able to undergo this surgery without needing factor replacement  We will communicate with his ENT surgeon Dr. Nowak and he may be able to follow up with local hematologist for future needs   Telemedicine 24 No official note in AEM[facility] at time of phone screening. Please review at PAT date on 24  Dr Gil ([facility]) referral to Dr Oconnor    Rotator cuff deficiency bilaterally with limited ROM    Factor 7 Activity         Component  Ref Range & Units 2 wk ago   Factor VII Activity  50 - 150 % 31 Low         Coagulation Screen        Component  Ref Range & Units 2 wk ago   Protime  9.8 - 12.8 seconds 14.6 High    INR  0.9 - 1.1 1.3 High    aPTT  27 - 38 seconds 29              Past Medical History:   Diagnosis Date    Arthritis     BMI 26.0-26.9,adult     Chronic ethmoidal sinusitis     Chronic headaches     Clotting disorder (Multi)     Coagulation disorder (Multi)     Deviated nasal septum     Factor deficiency, coagulation (Multi)     VII    Gilbert's syndrome     Pre-procedure lab exam     PT 15.2  Factor VII 28    Rotator cuff arthropathy of both shoulders        Past Surgical History:   Procedure Laterality Date    ANAL SPHINCTEROTOMY  2015    fissurectomy second time 2016    CARPAL TUNNEL RELEASE Bilateral 2004    CATARACT EXTRACTION Bilateral 2020    COLONOSCOPY      RETINAL DETACHMENT SURGERY Bilateral 2019    vitreous       Patient  reports that he is not currently sexually active.    Family History   Problem Relation Name Age of Onset    Hyperlipidemia Mother      Hypertension Mother      Heart disease Mother      Irritable bowel syndrome  Mother      Other (Pacemaker) Mother      Hyperlipidemia Father      Colon cancer Father      Melanoma Father      Anemia Father      Other (fissurectomy) Sister      Osteoarthritis Sister      Hyperlipidemia Brother      Hypertension Brother      Heart disease Brother      Other (AAA) Brother         Allergies   Allergen Reactions    Quinolones Other and Myalgia     Other Reaction(s): tendon        Prior to Admission medications    Medication Sig Start Date End Date Taking? Authorizing Provider   cholecalciferol (Vitamin D-3) 5,000 Units tablet    Yes Historical Provider, MD   lisinopril 20 mg tablet 1 tablet (20 mg). 10/31/16  Yes Historical Provider, MD   multivit with minerals/lutein (MULTIVITAMIN 50 PLUS ORAL)    Yes Historical Provider, MD   NIFEdipine XL 30 mg 24 hr tablet 1 tablet (30 mg). 11/7/16  Yes Historical Provider, MD   propranolol (Inderal) 20 mg tablet Take 1 tablet (20 mg) by mouth. 10/23/17  Yes Historical Provider, MD   psyllium (Metamucil) 0.52 gram capsule Psyllium Husk (Metamucil) 0.52 gram Capsule Active 1 CAP PO As Directed October 23rd, 2017 12:00am 10/23/17  Yes Historical Provider, MD   SUMAtriptan (Imitrex) 100 mg tablet Take 1 tablet (100 mg) by mouth. 10/31/16  Yes Historical Provider, MD        PAT ROS:   Constitutional:   neg    Neuro/Psych:   neg    Eyes:   neg    Ears:    tinnitus  Nose:    nasal discharge   sinus congestion (onset July 2023)  Mouth:   neg    Throat:   neg    Neck:   neg    Cardio:   neg    Respiratory:   neg    Endocrine:   neg    GI:   neg    :   neg    Musculoskeletal:    myalgias   decreased ROM (shoulders bilaterally)  Hematologic:   neg    Skin:  neg        DASI Risk Score      Flowsheet Row Most Recent Value   DASI SCORE 52.95   METS Score (Will be calculated only when all the questions are answered) 9.2          Caprini DVT Assessment    No data to display       Modified Frailty Index    No data to display       CHADS2 Stroke Risk  Current as of 21  minutes ago        N/A 3 to 100%: High Risk   2 to < 3%: Medium Risk   0 to < 2%: Low Risk     Last Change: N/A          This score determines the patient's risk of having a stroke if the patient has atrial fibrillation.        This score is not applicable to this patient. Components are not calculated.          Revised Cardiac Risk Index    No data to display       Apfel Simplified Score    No data to display       Risk Analysis Index Results This Encounter    No data found in the last 1 encounters.         Nurse Plan of Action:   Stop vitamins and supplements x 7 days    Bilateral endoscopic sinus surgery,septoplasty, bilateral submucosal inferior turbinate reductions. Liam Nowak MD on 5/10/2024  PCP Shemar Briones ([facility]) 3/27  Hem Onc Tia Oconnor (4/16)  Given that he has no bleeding issues, he should be able to undergo this surgery without needing factor replacement  We will communicate with his ENT surgeon Dr. Nowak and he may  be able to follow up with local hematologist for future needs   Telemedicine 4/30/24 No official note in AEMR at time of phone screening. Please review at PAT date on 5/2/24  Dr Gil ([facility]) referral to Dr Oconnor

## 2024-04-30 NOTE — PROGRESS NOTES
Hematology office visit      Reason for visit : Factor VII deficiency     ENT : Dr. Liam Nowak -    Hematologist : Dr. Bryson - [facility]     Interval history 4/30/24  Follow up phone visit to discuss test results and further management  No complaints, denies bleeding at any site of bruising  Overall doing well   Many questions about test results and implications for upcoming nasal surgery but also for the future, in case he needs any other surgical interventions, medications, travel  Questions about options for treatment in case of bleeding, surgical procedures  12 point review of systems negative except as noted above      HPI:   Referred by Dr. Charlie Rader from [place] for a factor VII deficiency disorder. He has medical history significant for chronic osteoarthritis , migraines, Gilbert's syndrome and chronic sinusitis. Had been evaluated by ENT Dr. Jung and was recommended ethmoidectomy with septoplasty. Preop labs showed isolated elevated PT prompting referral to Hematology .   He was seen by Dr. Gopi Rader at [facility] and had repaet etsting with factor VII levels which returned low at 28% and hence hje was referred to Dr. Oconnor for periop factor reaplcement . ENT urgeon also preferred that his surgery takes place  at [facility] so he was seen by Dr. Liam Nowak  who wanted periop recommendation for factor replacement .     Denies issues with bleeding or bruising spontaneously . He had had two laceration in his hands needing suturing ad also lip laceration , all of these were uneventful. wisdom teeth extraction and fissurectomy and anal sphincterotomy without increased bleeding. Unsure of bleeding history in family . Dad may have had some bleeding internally with anaemia , maternal uncle  great grandmother also with some bleeding disorder  undiagnosed . His son has no bleeding issues either .    LABS FROM OSH   Initial - PT 13.1 , aPTT 25.9   Repeat PT- 15.2 ,  mixing study corrected to 11.2 & then 10. Fibrinogen 230, Factor X 128 Factor VII - 28, Factor      PAST MEDICAL HISTORY  HTN, osteoarthritis    FAMILY HISTORY  Mother with heart failure, htn  Dad with cancer, brother with heart failure    SOCIAL HISTORY  Denies tobacco, etoh, illicits    medications and allergies reviewed in emr       Physical Exam:  There were no vitals filed for this visit. Phone visit  Speaking in full sentences, speech clear and fluent    Labs      Component  Ref Range & Units 2 wk ago   Factor VII Activity  50 - 150 % 31 Low             Component  Ref Range & Units 2 wk ago   Protime  9.8 - 12.8 seconds 14.6 High    INR  0.9 - 1.1 1.3 High    aPTT  27 - 38 seconds 29             Assessment/Plan:    Dean Rueda is a 58 y.o. male with pmhx of  chronic sinusitis  presenting for  preop evaluation for isolated prolonged PT & Factor VII deficiency     04/30/24   Isolated prolonged PT is indicative of a factor VII deficiency .   Repeat fVII level at 31%  Previous work up with normal fibrinogen.  Factor VII deficiency presents with a wide spectrum of clinical severity that correlates relatively poorly with plasma factor VII levels. Bleeding is unlikely with factor VII activity levels >10%, but some patients with undetectable levels are asymptomatic     Haemophilia. 1997;3(4):242.   Haematologica. 2004;89(6):704.   Am J Hematol. 2005;78(2):134.  Thromb Haemost. 2005;93(3):481.   J Thromb Haemost. 2011;9(6):1149.     Generally, factor replacement in not necessary for most surgical interventions unless  factor levels are <10-15% and thania for major surgical procedures.  Recommend no factor replacement for nasal surgery with factor VII level at 30% but prescribed TXA 1300mg tid starting day of surgery for 3-5 days.   Contacted Dr Liam Nowak (ENT) to recommend heme consult post op so we can monitor patient and coordinate post operatively.  In case of any bleeding issues, recommend  activated factor VII or novoseven at 10-15mcg/kg to be given at most q 12 hourly. While FFP can be used, this is a larger volume of fluid.    Plan of care discussed in detail with patient. All questions and concerns addressed and patient was comfortable with the plan of care.

## 2024-05-02 ENCOUNTER — PRE-ADMISSION TESTING (OUTPATIENT)
Dept: PREADMISSION TESTING | Facility: HOSPITAL | Age: 59
End: 2024-05-02
Payer: COMMERCIAL

## 2024-05-02 VITALS
WEIGHT: 194.9 LBS | HEIGHT: 73 IN | HEART RATE: 70 BPM | BODY MASS INDEX: 25.83 KG/M2 | SYSTOLIC BLOOD PRESSURE: 128 MMHG | OXYGEN SATURATION: 97 % | TEMPERATURE: 97.1 F | DIASTOLIC BLOOD PRESSURE: 82 MMHG

## 2024-05-02 DIAGNOSIS — J34.2 DEVIATED NASAL SEPTUM: ICD-10-CM

## 2024-05-02 DIAGNOSIS — J32.2 CHRONIC ETHMOIDAL SINUSITIS: ICD-10-CM

## 2024-05-02 DIAGNOSIS — Z01.818 PREOP EXAMINATION: Primary | ICD-10-CM

## 2024-05-02 LAB
ABO GROUP (TYPE) IN BLOOD: NORMAL
ALBUMIN SERPL BCP-MCNC: 4.5 G/DL (ref 3.4–5)
ANION GAP SERPL CALC-SCNC: 15 MMOL/L (ref 10–20)
ANTIBODY SCREEN: NORMAL
BUN SERPL-MCNC: 14 MG/DL (ref 6–23)
CALCIUM SERPL-MCNC: 9.4 MG/DL (ref 8.6–10.6)
CHLORIDE SERPL-SCNC: 102 MMOL/L (ref 98–107)
CO2 SERPL-SCNC: 26 MMOL/L (ref 21–32)
CREAT SERPL-MCNC: 0.79 MG/DL (ref 0.5–1.3)
EGFRCR SERPLBLD CKD-EPI 2021: >90 ML/MIN/1.73M*2
ERYTHROCYTE [DISTWIDTH] IN BLOOD BY AUTOMATED COUNT: 12 % (ref 11.5–14.5)
GLUCOSE SERPL-MCNC: 78 MG/DL (ref 74–99)
HCT VFR BLD AUTO: 42.7 % (ref 41–52)
HGB BLD-MCNC: 14.9 G/DL (ref 13.5–17.5)
MCH RBC QN AUTO: 31.8 PG (ref 26–34)
MCHC RBC AUTO-ENTMCNC: 34.9 G/DL (ref 32–36)
MCV RBC AUTO: 91 FL (ref 80–100)
NRBC BLD-RTO: 0 /100 WBCS (ref 0–0)
PHOSPHATE SERPL-MCNC: 2.9 MG/DL (ref 2.5–4.9)
PLATELET # BLD AUTO: 183 X10*3/UL (ref 150–450)
POTASSIUM SERPL-SCNC: 4.1 MMOL/L (ref 3.5–5.3)
RBC # BLD AUTO: 4.69 X10*6/UL (ref 4.5–5.9)
RH FACTOR (ANTIGEN D): NORMAL
SODIUM SERPL-SCNC: 139 MMOL/L (ref 136–145)
WBC # BLD AUTO: 5.8 X10*3/UL (ref 4.4–11.3)

## 2024-05-02 PROCEDURE — 99204 OFFICE O/P NEW MOD 45 MIN: CPT | Performed by: ANESTHESIOLOGY

## 2024-05-02 PROCEDURE — 85027 COMPLETE CBC AUTOMATED: CPT

## 2024-05-02 PROCEDURE — 80069 RENAL FUNCTION PANEL: CPT

## 2024-05-02 PROCEDURE — 36415 COLL VENOUS BLD VENIPUNCTURE: CPT

## 2024-05-02 PROCEDURE — 87081 CULTURE SCREEN ONLY: CPT

## 2024-05-02 PROCEDURE — 86901 BLOOD TYPING SEROLOGIC RH(D): CPT

## 2024-05-02 ASSESSMENT — DUKE ACTIVITY SCORE INDEX (DASI)
CAN YOU PARTICIPATE IN MODERATE RECREATIONAL ACTIVITIES LIKE GOLF, BOWLING, DANCING, DOUBLES TENNIS OR THROWING A BASEBALL OR FOOTBALL: YES
CAN YOU DO LIGHT WORK AROUND THE HOUSE LIKE DUSTING OR WASHING DISHES: YES
CAN YOU PARTICIPATE IN STRENOUS SPORTS LIKE SWIMMING, SINGLES TENNIS, FOOTBALL, BASKETBALL, OR SKIING: YES
CAN YOU WALK INDOORS, SUCH AS AROUND YOUR HOUSE: YES
CAN YOU WALK A BLOCK OR TWO ON LEVEL GROUND: YES
CAN YOU DO HEAVY WORK AROUND THE HOUSE LIKE SCRUBBING FLOORS OR LIFTING AND MOVING HEAVY FURNITURE: YES
CAN YOU DO MODERATE WORK AROUND THE HOUSE LIKE VACUUMING, SWEEPING FLOORS OR CARRYING GROCERIES: YES
CAN YOU HAVE SEXUAL RELATIONS: YES
TOTAL_SCORE: 58.2
CAN YOU RUN A SHORT DISTANCE: YES
DASI METS SCORE: 9.9
CAN YOU DO YARD WORK LIKE RAKING LEAVES, WEEDING OR PUSHING A MOWER: YES
CAN YOU TAKE CARE OF YOURSELF (EAT, DRESS, BATHE, OR USE TOILET): YES
CAN YOU CLIMB A FLIGHT OF STAIRS OR WALK UP A HILL: YES

## 2024-05-02 ASSESSMENT — ENCOUNTER SYMPTOMS
EYES NEGATIVE: 1
RESPIRATORY NEGATIVE: 1
MUSCULOSKELETAL NEGATIVE: 1
CONSTITUTIONAL NEGATIVE: 1
NEUROLOGICAL NEGATIVE: 1
GASTROINTESTINAL NEGATIVE: 1
CARDIOVASCULAR NEGATIVE: 1

## 2024-05-02 ASSESSMENT — LIFESTYLE VARIABLES: SMOKING_STATUS: NONSMOKER

## 2024-05-02 ASSESSMENT — ACTIVITIES OF DAILY LIVING (ADL): ADL_SCORE: 0

## 2024-05-02 NOTE — CPM/PAT H&P
CPM/PAT Evaluation       Name: Dean Rueda (Dean Rueda)  /Age: [birth year]//58 y.o.     In-Person       Chief Complaint: Evaluation prior to surgery     HPI  58 y.o. male scheduled for bilateral endoscopic sinus surgery  on 05/10 with Dr. Nowak.  PMHX includes HTN, Factor 7 difciency, gilberts syndrome, osteoperosis, diverticulosis.  Presents to [facility] today for perioperative risk stratification and optimization    Past Medical History:   Diagnosis Date    Arthritis     BMI 26.0-26.9,adult     Chronic ethmoidal sinusitis     Chronic headaches     Clotting disorder (Multi)     Coagulation disorder (Multi)     Deviated nasal septum     Factor deficiency, coagulation (Multi)     VII    Gilbert's syndrome     Hypertension     Pre-procedure lab exam     PT 15.2  Factor VII 28    Rotator cuff arthropathy of both shoulders        Past Surgical History:   Procedure Laterality Date    ANAL SPHINCTEROTOMY  2015    fissurectomy second time 2016    CARPAL TUNNEL RELEASE Bilateral 2004    CATARACT EXTRACTION Bilateral 2020    COLONOSCOPY      RETINAL DETACHMENT SURGERY Bilateral 2019    vitreous       Patient  reports that he is not currently sexually active.    Family History   Problem Relation Name Age of Onset    Hyperlipidemia Mother      Hypertension Mother      Heart disease Mother      Irritable bowel syndrome Mother      Other (Pacemaker) Mother      Hyperlipidemia Father      Colon cancer Father      Melanoma Father      Anemia Father      Other (fissurectomy) Sister      Osteoarthritis Sister      Hyperlipidemia Brother      Hypertension Brother      Heart disease Brother      Other (AAA) Brother         Allergies   Allergen Reactions    Quinolones Other and Myalgia     Other Reaction(s): tendon        Prior to Admission medications    Medication Sig Start Date End Date Taking? Authorizing Provider   cholecalciferol (Vitamin D-3) 5,000 Units tablet    Yes Historical Provider, MD   ferrous sulfate, 325  mg ferrous sulfate, tablet Take 1 tablet by mouth once daily with breakfast.   Yes Historical Provider, MD   lisinopril 20 mg tablet 1 tablet (20 mg). 10/31/16  Yes Historical Provider, MD   multivit with minerals/lutein (MULTIVITAMIN 50 PLUS ORAL)    Yes Historical Provider, MD   NIFEdipine XL 30 mg 24 hr tablet 1 tablet (30 mg). 11/7/16  Yes Historical Provider, MD   propranolol (Inderal) 20 mg tablet Take 1 tablet (20 mg) by mouth. 10/23/17  Yes Historical Provider, MD   psyllium (Metamucil) 0.52 gram capsule Psyllium Husk (Metamucil) 0.52 gram Capsule Active 1 CAP PO As Directed October 23rd, 2017 12:00am 10/23/17  Yes Historical Provider, MD   SUMAtriptan (Imitrex) 100 mg tablet Take 1 tablet (100 mg) by mouth. 10/31/16   Historical Provider, MD        PAT ROS:   Constitutional:   neg    Neuro/Psych:   neg    Eyes:   neg    Ears:   neg    Nose:   Mouth:   neg    Throat:   neg    Neck:   Cardio:   neg    Respiratory:   neg    Endocrine:   GI:   neg    :   neg    Musculoskeletal:   neg    Hematologic:   neg    Skin:      Physical Exam  Vitals reviewed.   Constitutional:       Appearance: Normal appearance.   HENT:      Head: Normocephalic and atraumatic.      Nose: Nose normal.   Eyes:      Extraocular Movements: Extraocular movements intact.      Conjunctiva/sclera: Conjunctivae normal.      Pupils: Pupils are equal, round, and reactive to light.   Cardiovascular:      Rate and Rhythm: Normal rate and regular rhythm.   Pulmonary:      Effort: Pulmonary effort is normal.      Breath sounds: Normal breath sounds.   Abdominal:      General: Abdomen is flat.      Palpations: Abdomen is soft.   Musculoskeletal:         General: Normal range of motion.   Skin:     General: Skin is warm.   Neurological:      General: No focal deficit present.      Mental Status: He is alert and oriented to person, place, and time. Mental status is at baseline.          PAT AIRWAY:   Airway:     Mallampati::  I    TM distance::  >3  FB    Neck ROM::  Full  normal        Visit Vitals  /82   Pulse 70   Temp 36.2 °C (97.1 °F)       DASI Risk Score      Flowsheet Row Most Recent Value   DASI SCORE 58.2   METS Score (Will be calculated only when all the questions are answered) 9.9          Caprini DVT Assessment      Flowsheet Row Most Recent Value   DVT Score 3   Current Status Minor surgery planned   Age 40-59 years   BMI 30 or less          Modified Frailty Index      Flowsheet Row Most Recent Value   Modified Frailty Index Calculator .1818          CHADS2 Stroke Risk  Current as of 14 minutes ago        N/A 3 to 100%: High Risk   2 to < 3%: Medium Risk   0 to < 2%: Low Risk     Last Change: N/A          This score determines the patient's risk of having a stroke if the patient has atrial fibrillation.        This score is not applicable to this patient. Components are not calculated.          Revised Cardiac Risk Index      Flowsheet Row Most Recent Value   Revised Cardiac Risk Calculator 0          Apfel Simplified Score      Flowsheet Row Most Recent Value   Apfel Simplified Score Calculator 1          Risk Analysis Index Results This Encounter         5/2/2024  0924             PA Cancer History: Patient does not indicate history of cancer    Total Risk Analysis Index Score Without Cancer: 19    Total Risk Analysis Index Score: 19          Stop Bang Score      Flowsheet Row Most Recent Value   Do you snore loudly? 1   Do you often feel tired or fatigued after your sleep? 0   Has anyone ever observed you stop breathing in your sleep? 1   Do you have or are you being treated for high blood pressure? 1   Recent BMI (Calculated) 25.7   Is BMI greater than 35 kg/m2? 0=No   Age older than 50 years old? 1=Yes   Is your neck circumference greater than 17 inches (Male) or 16 inches (Female)? 0   Gender - Male 1=Yes   STOP-BANG Total Score 5            Assessment and Plan:     No results found for this or any previous visit (from the past 24  hour(s)).     Assessment & Plan:    58 y.o.  male  scheduled for bilateral endoscopic sinus surgery  on 05/10 with Dr. Nowak.  PMHX includes HTN, Factor VII deficiency, gilberts syndrome, osteoperosis, diverticulosis.  Presents to [facility] today for perioperative risk stratification and optimization    Neuro:  No neurologic diagnosis or significant findings on chart review, clinical presentation and evaluation.  No grossly apparent neurologic perioperative risk.    HEENT:  Chronic ethmoid sinusitis: Scheduled for bilateral endoscopic sinus surgery,septoplasty, bilateral submucosal inferior turbinate reductions     Cardiovascular:  HTN: controlled on Nifedipine and lisnopril   METS: 9.9  RCRI: 0 points, 3.9%  risk for postoperative MACE     Pulmonary:  No pulmonary diagnosis or significant findings on chart review or clinical presentation.  No further preoperative testing is indicated at this time.  Stop Bang score is 5 placing patient at moderate risk for FABIAN  ARISCAT: <26 points, 1.6% risk of in-hospital postoperative pulmonary complication  PRODIGY: High risk for opioid induced respiratory depression    Renal:   No renal diagnosis, however patient is at increase risk for perioperative renal complications secondary to  Age equal to or greater than 56, HTN    Endocrine:  No endocrine diagnosis or significant findings on chart review or clinical presentation and evaluation. No further testing or intervention is indicated at this time.    Hematologic:  Factor  VII deficiency: discovered due to elevated INR. Factor VII =31%.   Hematology note: Recommend no factor replacement for nasal surgery with factor VII level at 30% but prescribed TXA 1300mg tid starting day of surgery for 3-5 days. In case of any bleeding issues, recommend activated factor VII or novoseven at 10-15mcg/kg to be given at most q 12 hourly. While FFP can be used, this is a larger volume of fluid.  Caprini Score 1, patient at Low risk for perioperative  DVT.  Patient provided with VTE education/handout.    Gastrointestinal:   [place] Disease: Well controlled, follows with primary care. Asymptomatic, not on any medications.   Apfel 1    Infectious disease:   No infectious diagnosis or significant findings on chart review or clinical presentation and evaluation.     Musculoskeletal:   No diagnosis or significant findings on chart review or clinical presentation and evaluation.     Anesthesia/Airway:  No anesthesia complications      Medication instructions and NPO guidelines reviewed with the patient.  All questions or concerns discussed and addressed.      Patient seen and staffed with Dr. Meléndez

## 2024-05-02 NOTE — PREPROCEDURE INSTRUCTIONS
Thank you for visiting [facility] ([facility]) today for your pre-procedure evaluation, you were seen by Dr Callahan ([phone])    This summary includes instructions and information to aid you during your perioperative period.  Please read carefully. If you have any questions about your visit today, please call the number listed above.  If you become ill or have any changes to your health before your surgery, please contact your primary care provider and alert your surgeon.    Preparing for your Surgery       Exercises  Preoperative Deep Breathing Exercises  Why it is important to do deep breathing exercises before my surgery?  Deep breathing exercises strengthen your breathing muscles.  This helps you to recover after your surgery and decreases the chance of breathing complications.  How are the deep breathing exercises done?  Sit straight with your back supported.  Breathe in deeply and slowly through your nose. Your lower rib cage should expand and your abdomen may move forward.  Hold that breath for 3 to 5 seconds.  Breathe out through pursed lips, slowly and completely.  Rest and repeat 10 times every hour while awake.  Rest longer if you become dizzy or lightheaded.       Incentive Spirometer   You were not provided an incentive spirometer in [facility], please disregard the incentive spirometer instructions  What is an incentive spirometer?  An incentive spirometer is a device used before and after surgery to “exercise” your lungs.  It helps you to take deeper breaths to expand your lungs.  Below is an example of a basic incentive spirometer.  The device you receive may differ slightly but they all function the same.    Why do I need to use an incentive spirometer?  Using your incentive spirometer prepares your lungs for surgery and helps prevent lung problems after surgery.  How do I use my incentive spirometer?  When you're using your incentive spirometer, make sure to breathe through your  mouth. If you breathe through your nose, the incentive spirometer won't work properly. You can hold your nose if you have trouble.  If you feel dizzy at any time, stop and rest. Try again at a later time.  Follow the steps below:  Set up your incentive spirometer, expand the flexible tubing and connect to the outlet.  Sit upright in a chair or bed. Hold the incentive spirometer at eye level.   Put the mouthpiece in your mouth and close your lips tightly around it. Slowly breathe out (exhale) completely.  Breathe in (inhale) slowly through your mouth as deeply as you can. As you take a breath, you will see the piston rise inside the large column. While the piston rises, the indicator should move upwards. It should stay in between the 2 arrows (see Figure).  Try to get the piston as high as you can, while keeping the indicator between the arrows.   If the indicator doesn't stay between the arrows, you're breathing either too fast or too slow.  When you get it as high as you can, hold your breath for 10 seconds, or as long as possible. While you're holding your breath, the piston will slowly fall to the base of the spirometer.  Once the piston reaches the bottom of the spirometer, breathe out slowly through your mouth. Rest for a few seconds.  Repeat 10 times. Try to get the piston to the same level with each breath.  Repeat every hour while awake  You can carefully clean the outside of the mouthpiece with an alcohol wipe or soap and water.      Sit-to-Stand Exercise    What is the sit-to-stand exercise?  The sit-to-stand exercise strengthens the muscles of your lower body and muscles in the center of your body (core muscles for stability) helping to maintain and improve your strength and mobility.  How do I do the sit-to-stand exercise?  The goal is to do this exercise without using your arms or hands.  If this is too difficult, use your arms and hands or a chair with armrests to help slowly push yourself to the  standing position and lower yourself back to the sitting position. As the movement becomes easier use your arms and hands less.    Steps to the sit-to-stand exercise  Sit up tall in a sturdy chair, knees bent, feet flat on the floor shoulder-width apart.  Shift your hips/pelvis forward in the chair to correctly position yourself for the next movement.  Lean forward at your hips.  Stand up straight putting equal weight on both feet.  Check to be sure you are properly aligned with the chair, in a slow controlled movement sit back down.  Repeat this exercise 10-15 times.  If needed you can do it fewer times until your strength improves.  Rest for 1 minute.  Do another 10-15 sit-to-stand exercises.  Try to do this in the morning and evening.        Instructions    Preoperative Fasting Guidelines    Why must I stop eating and drinking near surgery time?  With sedation, food or liquid in your stomach can enter your lungs causing serious complications  Food can increase nausea and vomiting  When do I need to stop eating and drinking before my surgery?      Do not eat any food after midnight the night before your surgery/procedure. You may have up to 13.5 ounces of clear liquid until TWO hours before your instructed arrival time to the hospital.  This includes water, black tea/coffee, (no milk or cream) apple juice, and electrolyte drinks (Gatorade). You may chew gum until TWO hours before your surgery/procedure            Simple things you can do to help prevent blood clots     Blood clots are blockages that can form in the body's veins. When a blood clot forms in your deep veins, it may be called a deep vein thrombosis, or DVT for short. Blood clots can happen in any part of the body where blood flows, but they are most common in the arms and legs. If a piece of a blood clot breaks free and travels to the lungs, it is called a pulmonary embolus (PE). A PE can be a very serious problem.         Being in the hospital or  having surgery can raise your chances of getting a blood clot because you may not be well enough to move around as much as you normally do.         Ways you can help prevent blood clots in the hospital       Wearing SCDs  SCDs stands for Sequential Compression Devices.   SCDs are special sleeves that wrap around your legs. They attach to a pump that fills them with air to gently squeeze your legs every few minutes.  This helps return the blood in your legs to your heart.   SCDs should only be taken off when walking or bathing. SCDs may not be comfortable, but they can help save your life.              Pump SCD leg sleeves  Wearing compression stockings - if your doctor orders them. These special snug-fitting stockings gently squeeze your legs to help blood flow.       Walking. Walking helps move the blood in your legs.   If your doctor says it is ok, try walking the halls at least   5 times a day. Ask us to help you get up, so you don't fall.      Taking any blood-thinning medicines your doctor orders.              Ways you can help prevent blood clots at home         Wearing compression stockings - if your doctor orders them.   Walking - to help move the blood in your legs.    Taking any blood-thinning medicines your doctor orders.      Signs of a blood clot or PE    Tell your doctor or nurse right away if you have any of the problems listed below.         If you are at home, seek medical care right away. Call 911 for chest pain or problems breathing.            Signs of a blood clot (DVT) - such as pain, swelling, redness, or warmth in your arm or legs.  Signs of a pulmonary embolism (PE) - such as chest pain or feeling short of breath      Tobacco and Alcohol;  Do not drink alcohol or smoke within 24 hours of surgery.  It is best to quit smoking for as long as possible before any surgery or procedure.      The Week before Surgery        Seven days before Surgery  Check your CPM medication instructions  Do the  exercises provided to you by CPM   Arrange for a responsible, adult licensed  to take you home after surgery and stay with you for 24 hours.  You will not be permitted to drive yourself home if you have received any anesthetic/sedation  Six days before surgery  Check your CPM medication instructions  Do the exercises provided to you by CPM   Start using Chlorhexidene (CHG) body wash if prescribed  Five days before surgery  Check your CPM medication instructions  Do the exercises provided to you by CPM   Continue to use CHG body wash if prescribed  Three days before surgery  Check your CPM medication instructions  Do the exercises provided to you by CPM   Continue to use CHG body wash if prescribed  Two days before surgery  Check your CPM medication instructions  Do the exercises provided to you by CPM   Continue to use CHG body wash if prescribed    The Day before Surgery       Check your CPM medication and all other CPM instructions including when to stop eating and drinking  You will be called with your arrival time for surgery in the late afternoon.  If you do not receive a call please reach out to your surgeon's office.  Do not smoke or drink 24 hours before surgery  Prepare items to bring with you to the hospital  Shower with your chlorhexidine wash if prescribed  Brush your teeth and use your chlorhexidine dental rinse if prescribed    The Day of Surgery       Check your CPM medication instructions  Ensure you follow the instructions for when to stop eating and drinking  Shower, if prescribed use CHG.  Do not apply any lotions, creams, moisturizers, perfume or deodorant  Brush your teeth and use your CHG dental rinse if prescribed  Wear loose comfortable clothing  Avoid make-up  Remove  jewelry and piercings, consider professional piercing removal with a plastic spacer if needed  Bring photo ID and Insurance card  Bring an accurate medication list that includes medication dose, frequency and  allergies  Bring a copy of your advanced directives (will, health care power of )  Bring any devices and controllers as well as medical devices you have been provided with for surgery (CPAP, slings, braces, etc.)  Dentures, eyeglasses, and contacts will be removed before surgery, please bring cases for contacts or glasses

## 2024-05-03 ENCOUNTER — ANESTHESIA EVENT (OUTPATIENT)
Dept: OPERATING ROOM | Facility: HOSPITAL | Age: 59
End: 2024-05-03
Payer: COMMERCIAL

## 2024-05-03 LAB — STAPHYLOCOCCUS SPEC CULT: ABNORMAL

## 2024-05-07 ENCOUNTER — APPOINTMENT (OUTPATIENT)
Dept: HEMATOLOGY/ONCOLOGY | Facility: HOSPITAL | Age: 59
End: 2024-05-07
Payer: COMMERCIAL

## 2024-05-07 DIAGNOSIS — R79.1 PROLONGED PT (PROTHROMBIN TIME): ICD-10-CM

## 2024-05-07 DIAGNOSIS — D68.2 FACTOR VII DEFICIENCY (MULTI): ICD-10-CM

## 2024-05-07 NOTE — PROGRESS NOTES
Order placed for Tranexamic Acid 650 mg - 2 tabs PO three times a day for 5 days to start day of surgery per verbal order from Dr. Nowak and Dr. Oconnor. Medication education provided to patient, advised to discontinue medications if adverse reactions.  Patient agrees to plan of care and prescription sent to patient pharmacy.

## 2024-05-08 RX ORDER — APREPITANT 40 MG/1
40 CAPSULE ORAL ONCE
Status: CANCELLED | OUTPATIENT
Start: 2024-05-08 | End: 2024-05-08

## 2024-05-08 RX ORDER — METOCLOPRAMIDE HYDROCHLORIDE 5 MG/ML
10 INJECTION INTRAMUSCULAR; INTRAVENOUS ONCE AS NEEDED
Status: CANCELLED | OUTPATIENT
Start: 2024-05-08

## 2024-05-08 RX ORDER — SODIUM CHLORIDE, SODIUM LACTATE, POTASSIUM CHLORIDE, CALCIUM CHLORIDE 600; 310; 30; 20 MG/100ML; MG/100ML; MG/100ML; MG/100ML
100 INJECTION, SOLUTION INTRAVENOUS CONTINUOUS
Status: CANCELLED | OUTPATIENT
Start: 2024-05-08

## 2024-05-08 RX ORDER — ONDANSETRON HYDROCHLORIDE 2 MG/ML
4 INJECTION, SOLUTION INTRAVENOUS ONCE AS NEEDED
Status: CANCELLED | OUTPATIENT
Start: 2024-05-08

## 2024-05-08 RX ORDER — ACETAMINOPHEN 325 MG/1
650 TABLET ORAL EVERY 4 HOURS PRN
Status: CANCELLED | OUTPATIENT
Start: 2024-05-08

## 2024-05-08 RX ORDER — LIDOCAINE HYDROCHLORIDE 10 MG/ML
0.1 INJECTION INFILTRATION; PERINEURAL ONCE
Status: CANCELLED | OUTPATIENT
Start: 2024-05-08 | End: 2024-05-08

## 2024-05-08 RX ORDER — LABETALOL HYDROCHLORIDE 5 MG/ML
5 INJECTION, SOLUTION INTRAVENOUS ONCE AS NEEDED
Status: CANCELLED | OUTPATIENT
Start: 2024-05-08

## 2024-05-08 RX ORDER — FENTANYL CITRATE 50 UG/ML
50 INJECTION, SOLUTION INTRAMUSCULAR; INTRAVENOUS EVERY 5 MIN PRN
Status: CANCELLED | OUTPATIENT
Start: 2024-05-08

## 2024-05-08 RX ORDER — TRANEXAMIC ACID 650 MG/1
1300 TABLET ORAL 3 TIMES DAILY
Qty: 30 TABLET | Refills: 0 | Status: SHIPPED | OUTPATIENT
Start: 2024-05-08 | End: 2024-05-14

## 2024-05-08 RX ORDER — FENTANYL CITRATE 50 UG/ML
25 INJECTION, SOLUTION INTRAMUSCULAR; INTRAVENOUS EVERY 5 MIN PRN
Status: CANCELLED | OUTPATIENT
Start: 2024-05-08

## 2024-05-08 RX ORDER — ALBUTEROL SULFATE 0.83 MG/ML
2.5 SOLUTION RESPIRATORY (INHALATION) ONCE AS NEEDED
Status: CANCELLED | OUTPATIENT
Start: 2024-05-08

## 2024-05-10 ENCOUNTER — ANESTHESIA (OUTPATIENT)
Dept: OPERATING ROOM | Facility: HOSPITAL | Age: 59
End: 2024-05-10
Payer: COMMERCIAL

## 2024-05-10 ENCOUNTER — HOSPITAL ENCOUNTER (OUTPATIENT)
Facility: HOSPITAL | Age: 59
Setting detail: OUTPATIENT SURGERY
Discharge: HOME | End: 2024-05-10
Attending: OTOLARYNGOLOGY | Admitting: OTOLARYNGOLOGY
Payer: COMMERCIAL

## 2024-05-10 VITALS
DIASTOLIC BLOOD PRESSURE: 86 MMHG | RESPIRATION RATE: 12 BRPM | OXYGEN SATURATION: 95 % | TEMPERATURE: 97.5 F | SYSTOLIC BLOOD PRESSURE: 133 MMHG | HEART RATE: 77 BPM

## 2024-05-10 DIAGNOSIS — J34.2 DEVIATED NASAL SEPTUM: ICD-10-CM

## 2024-05-10 DIAGNOSIS — J32.2 CHRONIC ETHMOIDAL SINUSITIS: Primary | ICD-10-CM

## 2024-05-10 PROBLEM — M15.9 OSTEOARTHRITIS OF MULTIPLE JOINTS: Status: ACTIVE | Noted: 2024-05-10

## 2024-05-10 PROBLEM — I10 PRIMARY HYPERTENSION: Status: ACTIVE | Noted: 2024-05-10

## 2024-05-10 PROBLEM — Z86.69 HISTORY OF MIGRAINE HEADACHES: Status: ACTIVE | Noted: 2024-05-10

## 2024-05-10 PROCEDURE — 3700000001 HC GENERAL ANESTHESIA TIME - INITIAL BASE CHARGE: Performed by: OTOLARYNGOLOGY

## 2024-05-10 PROCEDURE — 2500000005 HC RX 250 GENERAL PHARMACY W/O HCPCS

## 2024-05-10 PROCEDURE — 3600000008 HC OR TIME - EACH INCREMENTAL 1 MINUTE - PROCEDURE LEVEL THREE: Performed by: OTOLARYNGOLOGY

## 2024-05-10 PROCEDURE — 2500000001 HC RX 250 WO HCPCS SELF ADMINISTERED DRUGS (ALT 637 FOR MEDICARE OP)

## 2024-05-10 PROCEDURE — 2500000004 HC RX 250 GENERAL PHARMACY W/ HCPCS (ALT 636 FOR OP/ED): Performed by: OTOLARYNGOLOGY

## 2024-05-10 PROCEDURE — 7100000010 HC PHASE TWO TIME - EACH INCREMENTAL 1 MINUTE: Performed by: OTOLARYNGOLOGY

## 2024-05-10 PROCEDURE — 3600000003 HC OR TIME - INITIAL BASE CHARGE - PROCEDURE LEVEL THREE: Performed by: OTOLARYNGOLOGY

## 2024-05-10 PROCEDURE — 7100000009 HC PHASE TWO TIME - INITIAL BASE CHARGE: Performed by: OTOLARYNGOLOGY

## 2024-05-10 PROCEDURE — 88311 DECALCIFY TISSUE: CPT | Performed by: PATHOLOGY

## 2024-05-10 PROCEDURE — 2720000007 HC OR 272 NO HCPCS: Performed by: OTOLARYNGOLOGY

## 2024-05-10 PROCEDURE — 2500000004 HC RX 250 GENERAL PHARMACY W/ HCPCS (ALT 636 FOR OP/ED)

## 2024-05-10 PROCEDURE — A30520 PR REPAIR OF NASAL SEPTUM: Performed by: STUDENT IN AN ORGANIZED HEALTH CARE EDUCATION/TRAINING PROGRAM

## 2024-05-10 PROCEDURE — 2500000001 HC RX 250 WO HCPCS SELF ADMINISTERED DRUGS (ALT 637 FOR MEDICARE OP): Performed by: ANESTHESIOLOGY

## 2024-05-10 PROCEDURE — 30520 REPAIR OF NASAL SEPTUM: CPT | Performed by: OTOLARYNGOLOGY

## 2024-05-10 PROCEDURE — 31276 NSL/SINS NDSC FRNT TISS RMVL: CPT | Performed by: OTOLARYNGOLOGY

## 2024-05-10 PROCEDURE — 31259 NSL/SINS NDSC SPHN TISS RMVL: CPT | Performed by: OTOLARYNGOLOGY

## 2024-05-10 PROCEDURE — 30140 RESECT INFERIOR TURBINATE: CPT | Performed by: OTOLARYNGOLOGY

## 2024-05-10 PROCEDURE — 7100000002 HC RECOVERY ROOM TIME - EACH INCREMENTAL 1 MINUTE: Performed by: OTOLARYNGOLOGY

## 2024-05-10 PROCEDURE — 3700000002 HC GENERAL ANESTHESIA TIME - EACH INCREMENTAL 1 MINUTE: Performed by: OTOLARYNGOLOGY

## 2024-05-10 PROCEDURE — A4217 STERILE WATER/SALINE, 500 ML: HCPCS | Performed by: OTOLARYNGOLOGY

## 2024-05-10 PROCEDURE — 88305 TISSUE EXAM BY PATHOLOGIST: CPT | Mod: TC,SUR | Performed by: OTOLARYNGOLOGY

## 2024-05-10 PROCEDURE — 7100000001 HC RECOVERY ROOM TIME - INITIAL BASE CHARGE: Performed by: OTOLARYNGOLOGY

## 2024-05-10 PROCEDURE — 2500000001 HC RX 250 WO HCPCS SELF ADMINISTERED DRUGS (ALT 637 FOR MEDICARE OP): Performed by: OTOLARYNGOLOGY

## 2024-05-10 PROCEDURE — 31267 ENDOSCOPY MAXILLARY SINUS: CPT | Performed by: OTOLARYNGOLOGY

## 2024-05-10 PROCEDURE — 2500000005 HC RX 250 GENERAL PHARMACY W/O HCPCS: Performed by: OTOLARYNGOLOGY

## 2024-05-10 PROCEDURE — 88305 TISSUE EXAM BY PATHOLOGIST: CPT | Performed by: PATHOLOGY

## 2024-05-10 PROCEDURE — 61782 SCAN PROC CRANIAL EXTRA: CPT | Performed by: OTOLARYNGOLOGY

## 2024-05-10 RX ORDER — TRAMADOL HYDROCHLORIDE 50 MG/1
50 TABLET ORAL EVERY 6 HOURS PRN
Qty: 20 TABLET | Refills: 0 | Status: SHIPPED | OUTPATIENT
Start: 2024-05-10

## 2024-05-10 RX ORDER — TRANEXAMIC ACID 10 MG/ML
INJECTION, SOLUTION INTRAVENOUS AS NEEDED
Status: DISCONTINUED | OUTPATIENT
Start: 2024-05-10 | End: 2024-05-10

## 2024-05-10 RX ORDER — SODIUM CHLORIDE 0.9 G/100ML
IRRIGANT IRRIGATION AS NEEDED
Status: DISCONTINUED | OUTPATIENT
Start: 2024-05-10 | End: 2024-05-10 | Stop reason: HOSPADM

## 2024-05-10 RX ORDER — LIDOCAINE HYDROCHLORIDE 10 MG/ML
0.1 INJECTION INFILTRATION; PERINEURAL ONCE
Status: DISCONTINUED | OUTPATIENT
Start: 2024-05-10 | End: 2024-05-10 | Stop reason: HOSPADM

## 2024-05-10 RX ORDER — PHENYLEPHRINE HYDROCHLORIDE 10 MG/ML
INJECTION INTRAVENOUS AS NEEDED
Status: DISCONTINUED | OUTPATIENT
Start: 2024-05-10 | End: 2024-05-10

## 2024-05-10 RX ORDER — ONDANSETRON HYDROCHLORIDE 2 MG/ML
INJECTION, SOLUTION INTRAVENOUS AS NEEDED
Status: DISCONTINUED | OUTPATIENT
Start: 2024-05-10 | End: 2024-05-10

## 2024-05-10 RX ORDER — LIDOCAINE HYDROCHLORIDE 40 MG/ML
INJECTION, SOLUTION RETROBULBAR AS NEEDED
Status: DISCONTINUED | OUTPATIENT
Start: 2024-05-10 | End: 2024-05-10

## 2024-05-10 RX ORDER — PROPOFOL 10 MG/ML
INJECTION, EMULSION INTRAVENOUS AS NEEDED
Status: DISCONTINUED | OUTPATIENT
Start: 2024-05-10 | End: 2024-05-10

## 2024-05-10 RX ORDER — HYDROMORPHONE HYDROCHLORIDE 1 MG/ML
0.1 INJECTION, SOLUTION INTRAMUSCULAR; INTRAVENOUS; SUBCUTANEOUS EVERY 5 MIN PRN
Status: DISCONTINUED | OUTPATIENT
Start: 2024-05-10 | End: 2024-05-10 | Stop reason: HOSPADM

## 2024-05-10 RX ORDER — SODIUM CHLORIDE, SODIUM LACTATE, POTASSIUM CHLORIDE, CALCIUM CHLORIDE 600; 310; 30; 20 MG/100ML; MG/100ML; MG/100ML; MG/100ML
INJECTION, SOLUTION INTRAVENOUS CONTINUOUS PRN
Status: DISCONTINUED | OUTPATIENT
Start: 2024-05-10 | End: 2024-05-10

## 2024-05-10 RX ORDER — SODIUM CHLORIDE, SODIUM LACTATE, POTASSIUM CHLORIDE, CALCIUM CHLORIDE 600; 310; 30; 20 MG/100ML; MG/100ML; MG/100ML; MG/100ML
100 INJECTION, SOLUTION INTRAVENOUS CONTINUOUS
Status: DISCONTINUED | OUTPATIENT
Start: 2024-05-10 | End: 2024-05-10 | Stop reason: HOSPADM

## 2024-05-10 RX ORDER — HYDROMORPHONE HYDROCHLORIDE 1 MG/ML
0.2 INJECTION, SOLUTION INTRAMUSCULAR; INTRAVENOUS; SUBCUTANEOUS EVERY 5 MIN PRN
Status: DISCONTINUED | OUTPATIENT
Start: 2024-05-10 | End: 2024-05-10 | Stop reason: HOSPADM

## 2024-05-10 RX ORDER — ONDANSETRON HYDROCHLORIDE 2 MG/ML
4 INJECTION, SOLUTION INTRAVENOUS ONCE AS NEEDED
Status: DISCONTINUED | OUTPATIENT
Start: 2024-05-10 | End: 2024-05-10 | Stop reason: HOSPADM

## 2024-05-10 RX ORDER — HYDROMORPHONE HYDROCHLORIDE 1 MG/ML
0.5 INJECTION, SOLUTION INTRAMUSCULAR; INTRAVENOUS; SUBCUTANEOUS EVERY 5 MIN PRN
Status: DISCONTINUED | OUTPATIENT
Start: 2024-05-10 | End: 2024-05-10 | Stop reason: HOSPADM

## 2024-05-10 RX ORDER — OXYCODONE HYDROCHLORIDE 5 MG/1
5 TABLET ORAL EVERY 4 HOURS PRN
Status: DISCONTINUED | OUTPATIENT
Start: 2024-05-10 | End: 2024-05-10 | Stop reason: HOSPADM

## 2024-05-10 RX ORDER — FENTANYL CITRATE 50 UG/ML
INJECTION, SOLUTION INTRAMUSCULAR; INTRAVENOUS AS NEEDED
Status: DISCONTINUED | OUTPATIENT
Start: 2024-05-10 | End: 2024-05-10

## 2024-05-10 RX ORDER — ACETAMINOPHEN 325 MG/1
975 TABLET ORAL ONCE
Status: COMPLETED | OUTPATIENT
Start: 2024-05-10 | End: 2024-05-10

## 2024-05-10 RX ORDER — LIDOCAINE HYDROCHLORIDE AND EPINEPHRINE 10; 10 MG/ML; UG/ML
INJECTION, SOLUTION INFILTRATION; PERINEURAL AS NEEDED
Status: DISCONTINUED | OUTPATIENT
Start: 2024-05-10 | End: 2024-05-10 | Stop reason: HOSPADM

## 2024-05-10 RX ORDER — AMOXICILLIN 250 MG
1 CAPSULE ORAL DAILY
Qty: 10 TABLET | Refills: 0 | Status: SHIPPED | OUTPATIENT
Start: 2024-05-10 | End: 2024-05-20

## 2024-05-10 RX ORDER — GABAPENTIN 300 MG/1
300 CAPSULE ORAL ONCE
Status: COMPLETED | OUTPATIENT
Start: 2024-05-10 | End: 2024-05-10

## 2024-05-10 RX ORDER — ROCURONIUM BROMIDE 10 MG/ML
INJECTION, SOLUTION INTRAVENOUS AS NEEDED
Status: DISCONTINUED | OUTPATIENT
Start: 2024-05-10 | End: 2024-05-10

## 2024-05-10 RX ORDER — OXYMETAZOLINE HCL 0.05 %
SPRAY, NON-AEROSOL (ML) NASAL AS NEEDED
Status: DISCONTINUED | OUTPATIENT
Start: 2024-05-10 | End: 2024-05-10 | Stop reason: HOSPADM

## 2024-05-10 RX ORDER — CEFAZOLIN 1 G/1
INJECTION, POWDER, FOR SOLUTION INTRAVENOUS AS NEEDED
Status: DISCONTINUED | OUTPATIENT
Start: 2024-05-10 | End: 2024-05-10

## 2024-05-10 RX ADMIN — DEXAMETHASONE SODIUM PHOSPHATE 8 MG: 4 INJECTION, SOLUTION INTRA-ARTICULAR; INTRALESIONAL; INTRAMUSCULAR; INTRAVENOUS; SOFT TISSUE at 14:45

## 2024-05-10 RX ADMIN — TRANEXAMIC ACID 1000 MG: 10 INJECTION, SOLUTION INTRAVENOUS at 15:38

## 2024-05-10 RX ADMIN — PHENYLEPHRINE HYDROCHLORIDE 120 MCG: 10 INJECTION INTRAVENOUS at 15:20

## 2024-05-10 RX ADMIN — LIDOCAINE HYDROCHLORIDE 100 MG: 40 INJECTION, SOLUTION RETROBULBAR; TOPICAL at 14:45

## 2024-05-10 RX ADMIN — ACETAMINOPHEN 975 MG: 325 TABLET ORAL at 13:50

## 2024-05-10 RX ADMIN — SODIUM CHLORIDE, POTASSIUM CHLORIDE, SODIUM LACTATE AND CALCIUM CHLORIDE: 600; 310; 30; 20 INJECTION, SOLUTION INTRAVENOUS at 14:36

## 2024-05-10 RX ADMIN — SUGAMMADEX 200 MG: 100 INJECTION, SOLUTION INTRAVENOUS at 17:50

## 2024-05-10 RX ADMIN — CEFAZOLIN 2 G: 1 INJECTION, POWDER, FOR SOLUTION INTRAMUSCULAR; INTRAVENOUS at 15:00

## 2024-05-10 RX ADMIN — GABAPENTIN 300 MG: 300 CAPSULE ORAL at 13:50

## 2024-05-10 RX ADMIN — PROPOFOL 180 MG: 10 INJECTION, EMULSION INTRAVENOUS at 14:45

## 2024-05-10 RX ADMIN — OXYCODONE HYDROCHLORIDE 5 MG: 5 TABLET ORAL at 18:50

## 2024-05-10 RX ADMIN — REMIFENTANIL HYDROCHLORIDE 0.05 MCG/KG/MIN: 1 INJECTION, POWDER, LYOPHILIZED, FOR SOLUTION INTRAVENOUS at 14:50

## 2024-05-10 RX ADMIN — PHENYLEPHRINE HYDROCHLORIDE 120 MCG: 10 INJECTION INTRAVENOUS at 16:12

## 2024-05-10 RX ADMIN — HYDROMORPHONE HYDROCHLORIDE 0.5 MG: 1 INJECTION, SOLUTION INTRAMUSCULAR; INTRAVENOUS; SUBCUTANEOUS at 18:26

## 2024-05-10 RX ADMIN — ROCURONIUM BROMIDE 50 MG: 10 INJECTION INTRAVENOUS at 14:45

## 2024-05-10 RX ADMIN — FENTANYL CITRATE 100 MCG: 50 INJECTION, SOLUTION INTRAMUSCULAR; INTRAVENOUS at 14:45

## 2024-05-10 RX ADMIN — ONDANSETRON 4 MG: 2 INJECTION, SOLUTION INTRAMUSCULAR; INTRAVENOUS at 17:28

## 2024-05-10 SDOH — HEALTH STABILITY: MENTAL HEALTH: CURRENT SMOKER: 0

## 2024-05-10 ASSESSMENT — PAIN DESCRIPTION - DESCRIPTORS
DESCRIPTORS: SORE
DESCRIPTORS: BURNING;SORE

## 2024-05-10 ASSESSMENT — PAIN - FUNCTIONAL ASSESSMENT
PAIN_FUNCTIONAL_ASSESSMENT: 0-10

## 2024-05-10 ASSESSMENT — PAIN SCALES - GENERAL
PAINLEVEL_OUTOF10: 6
PAINLEVEL_OUTOF10: 7
PAINLEVEL_OUTOF10: 6
PAINLEVEL_OUTOF10: 6
PAINLEVEL_OUTOF10: 7
PAINLEVEL_OUTOF10: 8

## 2024-05-10 ASSESSMENT — COLUMBIA-SUICIDE SEVERITY RATING SCALE - C-SSRS
2. HAVE YOU ACTUALLY HAD ANY THOUGHTS OF KILLING YOURSELF?: NO
6. HAVE YOU EVER DONE ANYTHING, STARTED TO DO ANYTHING, OR PREPARED TO DO ANYTHING TO END YOUR LIFE?: NO
1. IN THE PAST MONTH, HAVE YOU WISHED YOU WERE DEAD OR WISHED YOU COULD GO TO SLEEP AND NOT WAKE UP?: NO

## 2024-05-10 NOTE — H&P
History Of Present Illness  Dean Rueda is a 58 y.o. male with a history of chrnoic sinusitis for many years which has been refractory to standard medical treatments.     Past Medical History  He has a past medical history of Arthritis, BMI 26.0-26.9,adult, Chronic ethmoidal sinusitis, Chronic headaches, Clotting disorder (Multi), Coagulation disorder (Multi), Deviated nasal septum, Factor deficiency, coagulation (Multi), Gilbert's syndrome, Hypertension, Pre-procedure lab exam, and Rotator cuff arthropathy of both shoulders.    Surgical History  He has a past surgical history that includes Anal sphincterotomy (2015); Colonoscopy; Carpal tunnel release (Bilateral, 2004); Cataract extraction (Bilateral, 2020); and Retinal detachment surgery (Bilateral, 2019).     Social History  He reports that he has never smoked. He has never used smokeless tobacco. He reports that he does not currently use alcohol. He reports that he does not use drugs.    Family History  Family History   Problem Relation Name Age of Onset    Hyperlipidemia Mother      Hypertension Mother      Heart disease Mother      Irritable bowel syndrome Mother      Other (Pacemaker) Mother      Hyperlipidemia Father      Colon cancer Father      Melanoma Father      Anemia Father      Other (fissurectomy) Sister      Osteoarthritis Sister      Hyperlipidemia Brother      Hypertension Brother      Heart disease Brother      Other (AAA) Brother          Allergies  Quinolones    Review of Systems   All other systems reviewed and are negative.       Physical Exam  Constitutional:       Appearance: Normal appearance.   HENT:      Head: Normocephalic and atraumatic.      Nose:      Comments: External nose midline, anterior rhinoscopy is normal with limited visualization to the anterior aspect of the interior turbinates     Mouth/Throat:      Mouth: Mucous membranes are moist.      Pharynx: Oropharynx is clear.   Eyes:      Extraocular Movements:  Extraocular movements intact.      Pupils: Pupils are equal, round, and reactive to light.   Cardiovascular:      Rate and Rhythm: Normal rate and regular rhythm.   Pulmonary:      Effort: Pulmonary effort is normal.      Breath sounds: Normal breath sounds.   Musculoskeletal:      Cervical back: Normal range of motion and neck supple.   Neurological:      Mental Status: He is alert.         Assessment/Plan   Active Problems:    Deviated nasal septum    Chronic ethmoidal sinusitis    Plan to proceed with surgery today as scheduled. Patient seen by hematology team and cleared for surgery given factor VII deficiency. There is plan for postoperative transexamic acid treatment.         Shemar Lopez MD

## 2024-05-10 NOTE — ANESTHESIA POSTPROCEDURE EVALUATION
Patient: Dean Rueda    Procedure Summary       Date: 05/10/24 Room / Location: [facility] OR 03 [place] [facility] [place]    Anesthesia Start: 1437 Anesthesia Stop: 1806    Procedures:       Bilateral endoscopic sinus surgery with image guidance, septoplasty, bilateral submucosal inferior turbinate reductions. (Bilateral)      Nasal Endoscopy with Excision Tissue Maxillary Sinus (Bilateral)      Repair Septum Nasal Cavity      Excision Nasal Turbinate (Bilateral)      Navigation-Assisted Surgery Diagnosis:       Deviated nasal septum      Chronic ethmoidal sinusitis      (Deviated nasal septum [J34.2])      (Chronic ethmoidal sinusitis [J32.2])    Surgeons: Liam Nowak MD Responsible Provider: Margo Lobo MD    Anesthesia Type: general ASA Status: 2            Anesthesia Type: general        Anesthesia Post Evaluation    Patient location during evaluation: PACU  Patient participation: complete - patient participated  Level of consciousness: awake and alert  Pain management: adequate  Airway patency: patent  Cardiovascular status: acceptable  Respiratory status: acceptable  Hydration status: acceptable  Postoperative Nausea and Vomiting: none        No notable events documented.

## 2024-05-10 NOTE — ANESTHESIA PREPROCEDURE EVALUATION
Patient: Dean Rueda    Procedure Information       Date/Time: 05/10/24 1245    Procedures:       Bilateral endoscopic sinus surgery with image guidance, septoplasty, bilateral submucosal inferior turbinate reductions. (Bilateral)      Nasal Endoscopy with Excision Tissue Maxillary Sinus (Bilateral)      Repair Septum Nasal Cavity      Excision Nasal Turbinate (Bilateral)      Navigation-Assisted Surgery    Location: [facility] OR 03 / [facility] OR    Surgeons: Liam Nowak MD            Relevant Problems   Anesthesia (within normal limits)      Cardiac   (+) Primary hypertension      Pulmonary (within normal limits)      Neuro   (+) History of migraine headaches      GI (within normal limits)      /Renal (within normal limits)      Liver (within normal limits)      Endocrine (within normal limits)      Hematology  Factor VII deficiency, 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 First dose of TXA taken this AM at 8:30      Musculoskeletal   (+) Osteoarthritis of multiple joints      HEENT   (+) Chronic ethmoidal sinusitis       Clinical information reviewed:   Tobacco  Allergies  Meds   Med Hx  Surg Hx   Fam Hx  Soc Hx        NPO Detail:  NPO/Void Status  Carbohydrate Drink Given Prior to Surgery? : N  Date of Last Liquid: 05/09/24  Time of Last Liquid: 2300  Date of Last Solid: 05/09/24  Time of Last Solid: 2300  Last Intake Type: Light meal  Time of Last Void: 1342         Physical Exam    Airway  Mallampati: II  TM distance: >3 FB  Neck ROM: full     Cardiovascular   Rhythm: regular  Rate: normal     Dental        Pulmonary   Breath sounds clear to auscultation     Abdominal   Abdomen: soft             Anesthesia Plan    History of general anesthesia?: yes  History of complications of general anesthesia?: no    ASA 2     general     The patient is not a current smoker.  Patient did not smoke on day of procedure.    intravenous induction    Trial extubation is planned.  Anesthetic plan and risks discussed with patient.  Use of blood products discussed with patient who.    Plan discussed with attending.

## 2024-05-10 NOTE — BRIEF OP NOTE
Date: 5/10/2024  OR Location: [facility] OR    Name: Dean Rueda, : 1965, Age: 58 y.o., MRN: [id number], Sex: male    Diagnosis  Pre-op Diagnosis     * Deviated nasal septum [J34.2]     * Chronic ethmoidal sinusitis [J32.2] Post-op Diagnosis     * Deviated nasal septum [J34.2]     * Chronic ethmoidal sinusitis [J32.2]     Procedures  1.  Bilateral nasal endoscopy with total ethmoidectomy including sphenoidotomy with tissue removal  2.  Bilateral nasal endoscopy with frontal sinusotomy  3.  Bilateral maxillary endoscopy with tissue removal  4.  Endoscopic septoplasty  5.  Bilateral submucosal inferior turbinate reductions  6.  Extracranial CT image guidance    Surgeons      * Liam Nowak - Primary    Resident/Fellow/Other Assistant:  Surgeons and Role:     * Shemar Lopez MD - Assisting    Procedure Summary  Anesthesia: General  ASA: II  Anesthesia Staff: Anesthesiologist: Jillian Meléndez MD; Margo Lobo MD  Anesthesia Resident: Camila Burrows MD  Estimated Blood Loss: 150 mL  Intra-op Medications:   Administrations occurring from 1245 to 1530 on 05/10/24:   Medication Name Total Dose   lidocaine-epinephrine (Xylocaine W/EPI) 1 %-1:100,000 injection 10 mL   sodium chloride 0.9 % irrigation solution 2,250 mL   oxymetazoline (Afrin) 0.05 % nasal spray 30 mL   acetaminophen (Tylenol) tablet 975 mg 975 mg   gabapentin (Neurontin) capsule 300 mg 300 mg              Anesthesia Record               Intraprocedure I/O Totals          Intake    Remifentanil Drip 0.00 mL    The total shown is the total volume documented since Anesthesia Start was filed.    Tranexamic Acid 0.00 mL    The total shown is the total volume documented since Anesthesia Start was filed.    lactated Ringer's 600.00 mL    Total Intake 600 mL       Output    Est. Blood Loss 150 mL    Total Output 150 mL       Net    Net Volume 450 mL          Specimen:   ID Type Source Tests Collected by Time   1 : RIGHT NASAL CONTENTS  Tissue NASAL/SINONASAL RIGHT CONTENTS SURGICAL PATHOLOGY EXAM Liam Nowak MD 5/10/2024 1733   2 : LEFT NASAL CONTENTS Tissue NASAL/SINONASAL LEFT CONTENTS SURGICAL PATHOLOGY EXAM Liam Nowak MD 5/10/2024 1734   3 : NASAL MICRODEBRIDER CONTENTS Tissue NASAL/SINONASAL MICRODEBRIDER SURGICAL PATHOLOGY EXAM Liam Nowak MD 5/10/2024 1734        Staff:   Circulator: Bienvenido Blanco RN  Relief Circulator: Micah Eng RN; Celestina Lange RN  Relief Scrub: Jonah Hedy  Scrub Person: John Castaneda; Tomasa Goldstein; Carlos Enrique Rush RN    Findings:   No purulence noted within the paranasal sinuses  Hemopore placed within each ethmoid cavity    Complications:  None; patient tolerated the procedure well.     Disposition: PACU - hemodynamically stable.  Condition: stable  Specimens Collected:   ID Type Source Tests Collected by Time   1 : RIGHT NASAL CONTENTS Tissue NASAL/SINONASAL RIGHT CONTENTS SURGICAL PATHOLOGY EXAM Liam Nowak MD 5/10/2024 1733   2 : LEFT NASAL CONTENTS Tissue NASAL/SINONASAL LEFT CONTENTS SURGICAL PATHOLOGY EXAM Liam Nowak MD 5/10/2024 1734   3 : NASAL MICRODEBRIDER CONTENTS Tissue NASAL/SINONASAL MICRODEBRIDER SURGICAL PATHOLOGY EXAM Liam Nowak MD 5/10/2024 1734     Attending Attestation:     Liam Nowak  Phone Number: [phone]

## 2024-05-10 NOTE — ANESTHESIA PROCEDURE NOTES
Airway  Date/Time: 5/10/2024 2:50 PM  Urgency: elective    Airway not difficult    Staffing  Performed: resident   Authorized by: Jillian Meléndez MD    Performed by: Camila Burrows MD  Patient location during procedure: OR    Indications and Patient Condition  Indications for airway management: anesthesia  Spontaneous ventilation: present  Sedation level: deep  Preoxygenated: yes  Patient position: sniffing  Mask difficulty assessment: 1 - vent by mask  Planned trial extubation    Final Airway Details  Final airway type: endotracheal airway      Successful airway: ETT  Cuffed: yes   Successful intubation technique: direct laryngoscopy  Facilitating devices/methods: intubating stylet  Endotracheal tube insertion site: oral  Blade: Derik  Blade size: #3  ETT size (mm): 7.5  Cormack-Lehane Classification: grade I - full view of glottis  Placement verified by: chest auscultation and capnometry   Number of attempts at approach: 1  Number of other approaches attempted: 0

## 2024-05-10 NOTE — DISCHARGE INSTRUCTIONS
Nasal and Sinus Surgery at Home Instructions  The following instructions will help you know what to expect in the days following your surgery.     Please have the following items available at your home/recovery residence:  ·-Nasal saline spray  · Oxymetazoline (Afrin®) or Phenylephrine (Roosevelt-Synephrine®) are topical decongestant sprays   - they should ONLY be used if you have a nosebleed or excessive bleeding  · 4 x 4 gauze and paper tape  · Tylenol® (adjunct to prescription therapy or as sole pain medication)  -Oral TXA as prescribed/discussed with hematology team    Activities  · You may shower the same day as your surgery   · Avoid sneezing or blowing your nose for 2 weeks after surgery / if you do sneeze, do so with your mouth open  · Avoid strenuous activity for 2 weeks after surgery / do not lift heavy objects (greater than 10 pounds) for 1 week after surgery  · Walking is strongly encouraged after surgery   · Most patients return to work without about 5 to 7 days after surgery but this is variable    Diet  · You can resume a normal diet within 24 hours of the surgery      Fever  · A low-grade fever, less than 101° F is not uncommon for 2 to 3 days after surgery. If fever continues or is higher than 101° F, call your physician.  You can use Tylenol® to control the fever.      Pain Control  · Pain is variable after nasal surgery but is generally well controlled with pain medication.  Most patients feel pressure and congestion.  Pain should lessen with time. If pain increases, call our office.  · For mild pain, acetaminophen (Tylenol®) is recommended.  We suggest scheduling 500-1000 mg of acetaminophen every 6 hours for the first several days (unless you are allergic to this medicine or have problems with your liver).  Do not take more than 4000mg in a day.  We will also prescribe stronger pain medication for after surgery. Drink plenty of water as these stronger pain medications can be constipating.  We also  recommend that you take stool softeners on a regular basis while taking narcotic pain medication.  -Do not take ibuprofen, Advil, Motrin, aspirin, or other NSAIDS (non-steroidal anti-inflammatory). These medications increase your risk of bleeding.    Drainage  · You can expect to have bloody nasal drainage after nasal or sinus surgery. To catch   this drainage and to help avoid continuous nose wiping, we usually put a gauze “mustache” dressing under the nose and tape it to the cheeks for as long as the drainage continues.    · BLEEDING: Some blood in the nasal drainage after surgery is expected. This may be red, dark red or brown. One way to monitor this is to tape a piece of gauze under the nose to collect the bloody drainage. This may saturate with blood every 1 to 2 hours for the first few days after surgery. If it saturates completely with blood (blood dripping off of it and totally red) MORE FREQUENTLY THAN EVERY 30 MINUTES then call our office. Avoid nose blowing and try to sneeze with your mouth open.  Sleeping with your head elevated for at least the first night will also help prevent bleeding and reduce congestion. If you have a nosebleed, try spraying a topical decongestant spray (see page 1) into the side of bleeding 2-3 sprays and hold gentle pressure for 10 minutes.  If the bleeding continues after this is done twice call our office.       Care of the Nose  ·  Start nasal saline rinses as instructed/prescribed after surgery to prevent crusting.    Follow up  Your appointment for follow up after your surgery should have been scheduled at the time of your surgery. If not, call the office for an appointment scheduled for 1-2 weeks after the date of your surgery.    Call the office or GO TO THE EMERGENCY ROOM if you have:  · Excessive pain, swelling, bleeding or drainage  · Shortness of breath or difficulty breathing  · Persistent nausea and vomiting  · Fever that continues or is higher than 101° F  · Neck  stiffness (cannot touch your chin to your chest)  · Confusion  · Worsening headaches that do not respond to pain medication  · Reproducible clear drainage dripping from your nose like a leaky faucet (particularly one sided).  Note:  This may happen and is normal up to 30 minutes following saline irrigations or sprays but if it is reproducible despite stopping saline please contact our office   · Salty or metallic taste (note that you may experience a salty taste that is normal up to 30 minutes following saline use)    Do not hesitate to call if you have any questions or concerns:  Offices of Otolaryngology - Division of Rhinology  Normal office hours are:  8am-4pm Monday - Friday   If there is an after-hours EMERGENCY related to your procedure please call [phone] (ask for the “ENT resident on-call”).

## 2024-05-12 NOTE — OP NOTE
Bilateral endoscopic sinus surgery with image guidance, septoplasty, bilateral submucosal inferior turbinate reductions. (B), Nasal Endoscopy with Excision Tissue Maxillary Sinus (B), Repair Septum Nasal Cavity, Excision Nasal Turbinate (B), Navigation-Assisted Surgery Operative Note     Date: 5/10/2024  OR Location: [facility] OR    Name: Dean Rueda, : 1965, Age: 58 y.o., MRN: [id number], Sex: male    Diagnosis  Pre-op Diagnosis     * Deviated nasal septum [J34.2]     * Chronic ethmoidal sinusitis [J32.2]  Chronic frontal sinusitis  Chronic maxillary sinusitis  Inferior turbinate hypertrophy  Nasal airway obstruction  Factor VII deficiency Post-op Diagnosis  Same     Procedures  1.  Bilateral nasal endoscopy with total ethmoidectomy including sphenoidotomy with tissue removal  2.  Bilateral nasal endoscopy with frontal sinusotomy  3.  Bilateral maxillary endoscopy with tissue removal  4.  Endoscopic septoplasty  5.  Bilateral submucosal inferior turbinate reductions  6.  Extracranial CT image guidance    Surgeons      * Liam Nowak - Primary    Resident/Fellow/Other Assistant:  Surgeons and Role:     * Shemar Lopez MD - Assisting    Procedure Summary  Anesthesia: General  ASA: II  Anesthesia Staff: Anesthesiologist: Jillian Meléndez MD; Margo Lobo MD  Anesthesia Resident: Camila Burrows MD  Estimated Blood Loss: 150mL  Intra-op Medications:   Administrations occurring from 1245 to 1530 on 05/10/24:   Medication Name Total Dose   lidocaine-epinephrine (Xylocaine W/EPI) 1 %-1:100,000 injection 10 mL   sodium chloride 0.9 % irrigation solution 2,250 mL   oxymetazoline (Afrin) 0.05 % nasal spray 30 mL   acetaminophen (Tylenol) tablet 975 mg 975 mg   gabapentin (Neurontin) capsule 300 mg 300 mg              Anesthesia Record               Intraprocedure I/O Totals          Intake    Remifentanil Drip 0.00 mL    The total shown is the total volume documented since Anesthesia Start was  filed.    Tranexamic Acid 0.00 mL    The total shown is the total volume documented since Anesthesia Start was filed.    lactated Ringer's 600.00 mL    Total Intake 600 mL       Output    Est. Blood Loss 150 mL    Total Output 150 mL       Net    Net Volume 450 mL          Specimen:   ID Type Source Tests Collected by Time   1 : RIGHT NASAL CONTENTS Tissue NASAL/SINONASAL RIGHT CONTENTS SURGICAL PATHOLOGY EXAM Liam Nowak MD 5/10/2024 1733   2 : LEFT NASAL CONTENTS Tissue NASAL/SINONASAL LEFT CONTENTS SURGICAL PATHOLOGY EXAM Liam Nowak MD 5/10/2024 1734   3 : NASAL MICRODEBRIDER CONTENTS Tissue NASAL/SINONASAL MICRODEBRIDER SURGICAL PATHOLOGY EXAM Liam Nowak MD 5/10/2024 1734        Staff:   Circulator: Bienvenido Blanco RN  Relief Circulator: Micah Eng RN; Celestina Lange RN  Relief Scrub: Jonah Knott  Scrub Person: John Castaneda; Tomasa Goldstein; Carlos Enrique Rush RN    Findings:   Hemopore placed within each ethmoid cavity at the conclusion of the procedure  Each middle turbinate was preserved  No nonabsorbable packing was placed    Indications: Dean Rueda who has persistent sinonasal symptoms despite adequate medical therapy.  He and I discussed options both medical and surgical and he was comfortable moving forward with endonasal surgery.  Preoperative workup outside of  demonstrated him to have a factor VII deficiency.  He was evaluated through hematology and was optimized for the surgical procedure.    Procedure Details:     After informed consent was obtained and all questions were answered the patient was brought to the operating room and placed supine on the operating room table.  General anesthesia was induced by the anesthesia staff and the patient was orally intubated.  The table was turned 90 degrees.  Afrin-soaked pledgets were placed within both the patient's nasal cavities.    The CT image guidance system was brought to the field.  The CT data was  uploaded reviewed and the plan was finalized.  The headset was attached to the patient.  The image guidance was registered accurate in 3 separate orientations and was used throughout the surgical procedure to identify critical landmarks such as the borders of the orbit as well as the ethmoid skull base.  The patient was then prepped and draped in a standard fashion for endonasal surgery.    Bilateral submucosal inferior turbinate reductions were performed.  1% lidocaine with 1-100,000 epinephrine was injected into the anterior aspect of each inferior turbinate, septum on each side, and the axilla of each middle turbinate.  After a period of approximately 10 minutes an incision was made on the anterior aspect of each inferior turbinate.  Submucosal tunnels were dissected throughout the length of each inferior turbinate and using a microdebrider blade submucosal tissue was debulked throughout the length of each inferior turbinate.  There were then each outfractured.    Endoscopic septoplasty was performed.  Examination of the right nasal cavity revealed a significant septal deviation to that side particularly within the first 25 to 30 mm of the septum.  A L[place]is incision was made on the right-hand side sparing approximately 15 mm of caudal strut.  Submucoperichondrial and submucoperiosteal planes were developed and the deviated segments of cartilage and bone were widely exposed.  The septum was traversed anteriorly and in likewise fashion submucoperichondrial and submucoperiosteal planes were developed.  The deviated segments of cartilage and bone were then removed.  Of note, there was rather significant submucosal scarring elevating complexity of this dissection.  Once the bone and cartilage was removed the septum became significantly more midline and the Amari incision was closed with 4-0 chromic.    Bilateral maxillary endoscopy with tissue removal was performed.  Bilaterally, each uncinate process was  identified and resected and each maxillary sinus was opened from the lacrimal bone anteriorly to the posterior wall of each maxillary sinus posteriorly.  The natural drainage pathway on each side was incorporated into the greater antrostomy.  Edematous mucosa within each maxillary was debrided but there was no purulence noted.    Bilateral nasal endoscopy with total ethmoidectomy including sphenoidotomy with tissue removal was performed.  Bilaterally anterior and posterior ethmoid air cells adjacent to the lamina and along the ethmoid skull base were widely removed.  The base lamella was traversed and the inferior one third of the superior turbinate was identified and resected.  The natural drainage pathway of each sphenoid was identified and cannulated and each sphenoidotomy was opened widely in all orientations.  Edematous mucosa within each sphenoid was debrided but there was no purulence within either sphenoid.  At the conclusion of this dissection each lamina had been widely delineated as well as the ethmoid skull base.    Bilateral nasal endoscopy with frontal sinusotomy was performed.  With identification of the cranial base posteriorly I proceeded anteriorly.  Remnant anterior ethmoid air cells within each frontal drainage pathway were identified and removed and each frontal sinus was cannulated and opened widely in all orientations.  At the conclusion of this dissection, each frontal was opened from the axilla of the middle turbinate medially to the orbit laterally and from the anterior to posterior tables.    This concluded the procedure.  Hemostasis was deemed excellent.  I cauterized along the cut edge of the basal lamella bilaterally and at each sphenoid face.  Hemopore was placed within each ethmoid cavity for additional hemostasis.  The patient was turned over to the anesthesia staff and extubated in the operating room without complication.  He was transported to the postanesthesia care unit in  satisfactory condition.    Complications:  None; patient tolerated the procedure well.    Disposition: PACU - hemodynamically stable.  Condition: stable     Attending Attestation: I was present and scrubbed for the key portions of the procedure.    Liam Nowak  Phone Number: [phone]

## 2024-05-14 ENCOUNTER — OFFICE VISIT (OUTPATIENT)
Dept: OTOLARYNGOLOGY | Facility: CLINIC | Age: 59
End: 2024-05-14
Payer: COMMERCIAL

## 2024-05-14 VITALS — WEIGHT: 188.8 LBS | BODY MASS INDEX: 25.02 KG/M2 | TEMPERATURE: 97.2 F | HEIGHT: 73 IN

## 2024-05-14 DIAGNOSIS — J32.2 CHRONIC ETHMOIDAL SINUSITIS: Primary | ICD-10-CM

## 2024-05-14 DIAGNOSIS — J32.1 CHRONIC FRONTAL SINUSITIS: ICD-10-CM

## 2024-05-14 DIAGNOSIS — J34.89 NASAL CONGESTION WITH RHINORRHEA: ICD-10-CM

## 2024-05-14 DIAGNOSIS — R09.81 NASAL CONGESTION WITH RHINORRHEA: ICD-10-CM

## 2024-05-14 PROCEDURE — 99024 POSTOP FOLLOW-UP VISIT: CPT | Performed by: OTOLARYNGOLOGY

## 2024-05-14 PROCEDURE — 1036F TOBACCO NON-USER: CPT | Performed by: OTOLARYNGOLOGY

## 2024-05-14 PROCEDURE — 31237 NSL/SINS NDSC SURG BX POLYPC: CPT | Performed by: OTOLARYNGOLOGY

## 2024-05-14 NOTE — PROGRESS NOTES
Chief Complaint:  1.  Chronic sinusitis s/p bilateral endoscopic sinus surgery on 5/10/24  2.  Nasal airway obstruction s/p endoscopic septoplasty and bilateral inferior turbinate reductions on 5/10/24  3.  Rhinorrhea  4.  Facial pain and pressure, headaches  5.  Decreased sense of smell  6.  Throat clearing, coughing, dysphonia  7.  Tinnitus  8.  Factor VII deficiency    History Of Present Illness:  Reason for this visit:    Dean Rueda presents for his first postoperative evaluation.  In the postoperative setting, he has been having congestion but denies significant bleeding.  He is using saline rinses multiple times per day.     Active Problems:  Patient Active Problem List   Diagnosis    Deviated nasal septum    Chronic ethmoidal sinusitis    Primary hypertension    History of migraine headaches    Osteoarthritis of multiple joints      Past Medical History:  He has a past medical history of Arthritis, BMI 26.0-26.9,adult, Chronic ethmoidal sinusitis, Chronic headaches, Clotting disorder (Multi), Coagulation disorder (Multi), Deviated nasal septum, Factor deficiency, coagulation (Multi), Gilbert's syndrome, Hypertension, Pre-procedure lab exam, and Rotator cuff arthropathy of both shoulders.    Surgical History:  He has a past surgical history that includes Anal sphincterotomy (2015); Colonoscopy; Carpal tunnel release (Bilateral, 2004); Cataract extraction (Bilateral, 2020); and Retinal detachment surgery (Bilateral, 2019).     Family History:  Family History   Problem Relation Name Age of Onset    Hyperlipidemia Mother      Hypertension Mother      Heart disease Mother      Irritable bowel syndrome Mother      Other (Pacemaker) Mother      Hyperlipidemia Father      Colon cancer Father      Melanoma Father      Anemia Father      Other (fissurectomy) Sister      Osteoarthritis Sister      Hyperlipidemia Brother      Hypertension Brother      Heart disease Brother      Other (AAA) Brother       Social  History:  He reports that he has never smoked. He has never used smokeless tobacco. He reports that he does not currently use alcohol. He reports that he does not use drugs.     Allergies:  Quinolones    Current Meds:    Current Outpatient Medications:     cholecalciferol (Vitamin D-3) 5,000 Units tablet, , Disp: , Rfl:     ferrous sulfate, 325 mg ferrous sulfate, tablet, Take 1 tablet by mouth once daily with breakfast., Disp: , Rfl:     lisinopril 20 mg tablet, 1 tablet (20 mg)., Disp: , Rfl:     multivit with minerals/lutein (MULTIVITAMIN 50 PLUS ORAL), , Disp: , Rfl:     NIFEdipine XL 30 mg 24 hr tablet, 1 tablet (30 mg)., Disp: , Rfl:     propranolol (Inderal) 20 mg tablet, Take 1 tablet (20 mg) by mouth., Disp: , Rfl:     psyllium (Metamucil) 0.52 gram capsule, Psyllium Husk (Metamucil) 0.52 gram Capsule Active 1 CAP PO As Directed October 23rd, 2017 12:00am, Disp: , Rfl:     sennosides-docusate sodium (Lourdes-Colace) 8.6-50 mg tablet, Take 1 tablet by mouth once daily for 10 days., Disp: 10 tablet, Rfl: 0    sodium chloride (Ocean) 0.65 % nasal spray, Administer 2 sprays into each nostril if needed for congestion (Encourage use at least 6-10 times per day) for up to 14 days., Disp: 30 mL, Rfl: 0    SUMAtriptan (Imitrex) 100 mg tablet, Take 1 tablet (100 mg) by mouth., Disp: , Rfl:     traMADol (Ultram) 50 mg tablet, Take 1 tablet (50 mg) by mouth every 6 hours if needed for severe pain (7 - 10)., Disp: 20 tablet, Rfl: 0    Vitals:  Visit Vitals  Smoking Status Never      Physical Exam:  Nose: On external exam there are neither lesions nor asymmetry of the nasal tip/dorsum. On anterior rhinoscopy, visualization posteriorly is limited on anterior examination. For this reason, to adequately evaluate posteriorly for masses, source of epistaxis, polypoid disease, debridement, and/or signs of infections, nasal endoscopy is indicated.  (Please see procedure below.)    SINONASAL ENDOSCOPY WITH DEBRIDEMENT (CPT  05923-31-51):  Due to the patient's chronic sinusitis/chronic rhinitis, sinonasal endoscopy with debridement is indicated. After discussion of risks and benefits, and topical decongestion and anesthesia, an endoscope was used to perform nasal endoscopy with debridement.  A timeout identifying the patient, the procedure, and any concerns was performed prior to beginning the procedure.    Findings:  Examination of the right nasal cavity revealed crusting and debris between the septum and the lateral wall throughout its length that was removed.  There was no tethering between the septum and lateral wall following this.  Crust, clot, and debris was evacuated from the maxillary, ethmoid, and sphenoid.  I left some debris over the cut edge of the basal lamella and the floor of the sphenoidotomy.  There was no pus.  Examination of the left nasal cavity revealed less significant debris in the nasal cavity itself.  Crust, clot, and debris was evacuated from the maxillary, ethmoid, and into the sphenoid with a suction and alligator.  Again I left some debris over the cut edge of the basal lamella and base sphenoidotomy.  I left some debris in each frontal drainage pathway.  He tolerated the procedure very well.    Provider Impressions:  1.  Chronic sinusitis s/p bilateral endoscopic sinus surgery on 5/10/24  2.  Nasal airway obstruction s/p endoscopic septoplasty and bilateral inferior turbinate reductions on 5/10/24  3.  Rhinorrhea  4.  Facial pain and pressure, headaches  5.  Decreased sense of smell  6.  Throat clearing, coughing, dysphonia  7.  Tinnitus  8.  Factor VII deficiency    Discussion:  Dean Rueda appeared well.  I asked him to continue rinses multiple times per day and follow-up with me in 1 week.  His job is quite strenuous so I recommended not returning to work for 2 weeks following his surgical date.  He was amenable to this and all questions were answered.    Signature:  Scribe Attestation  By  signing my name below, I, Viry Forbes   attest that this documentation has been prepared under the direction and in the presence of Liam Nowak MD.

## 2024-05-21 LAB
LABORATORY COMMENT REPORT: NORMAL
PATH REPORT.FINAL DX SPEC: NORMAL
PATH REPORT.GROSS SPEC: NORMAL
PATH REPORT.RELEVANT HX SPEC: NORMAL
PATH REPORT.TOTAL CANCER: NORMAL

## 2024-05-22 ENCOUNTER — OFFICE VISIT (OUTPATIENT)
Dept: OTOLARYNGOLOGY | Facility: CLINIC | Age: 59
End: 2024-05-22
Payer: COMMERCIAL

## 2024-05-22 DIAGNOSIS — J32.2 CHRONIC ETHMOIDAL SINUSITIS: Primary | ICD-10-CM

## 2024-05-22 DIAGNOSIS — J32.1 CHRONIC FRONTAL SINUSITIS: ICD-10-CM

## 2024-05-22 PROCEDURE — 99024 POSTOP FOLLOW-UP VISIT: CPT | Performed by: OTOLARYNGOLOGY

## 2024-05-22 PROCEDURE — 31237 NSL/SINS NDSC SURG BX POLYPC: CPT | Performed by: OTOLARYNGOLOGY

## 2024-05-22 RX ORDER — PREDNISONE 10 MG/1
TABLET ORAL
Qty: 19 TABLET | Refills: 0 | Status: SHIPPED | OUTPATIENT
Start: 2024-05-22

## 2024-05-22 NOTE — PROGRESS NOTES
Chief Complaint:  1.  Chronic sinusitis s/p bilateral endoscopic sinus surgery on 5/10/24  2.  Nasal airway obstruction s/p endoscopic septoplasty and bilateral inferior turbinate reductions on 5/10/24  3.  Rhinorrhea  4.  Facial pain and pressure, headaches  5.  Decreased sense of smell  6.  Throat clearing, coughing, dysphonia  7.  Tinnitus  8.  Factor VII deficiency    History Of Present Illness:  Reason for this visit:    Dean Rueda presents for his second postoperative visit.     He has been having some intermittent left-sided facial discomfort that has been improving over the last week.  He has also been having some generalized facial pain and pressure.  This is worse when bending forward.  He has had some drainage from the nose over the past 2 days after rinsing.  This is not reproducible with bending over.     Active Problems:  Patient Active Problem List   Diagnosis    Deviated nasal septum    Chronic ethmoidal sinusitis    Primary hypertension    History of migraine headaches    Osteoarthritis of multiple joints      Past Medical History:  He has a past medical history of Arthritis, BMI 26.0-26.9,adult, Chronic ethmoidal sinusitis, Chronic headaches, Clotting disorder (Multi), Coagulation disorder (Multi), Deviated nasal septum, Factor deficiency, coagulation (Multi), Gilbert's syndrome, Hypertension, Pre-procedure lab exam, and Rotator cuff arthropathy of both shoulders.    Surgical History:  He has a past surgical history that includes Anal sphincterotomy (2015); Colonoscopy; Carpal tunnel release (Bilateral, 2004); Cataract extraction (Bilateral, 2020); and Retinal detachment surgery (Bilateral, 2019).     Family History:  Family History   Problem Relation Name Age of Onset    Hyperlipidemia Mother      Hypertension Mother      Heart disease Mother      Irritable bowel syndrome Mother      Other (Pacemaker) Mother      Hyperlipidemia Father      Colon cancer Father      Melanoma Father       Anemia Father      Other (fissurectomy) Sister      Osteoarthritis Sister      Hyperlipidemia Brother      Hypertension Brother      Heart disease Brother      Other (AAA) Brother       Social History:  He reports that he has never smoked. He has never used smokeless tobacco. He reports that he does not currently use alcohol. He reports that he does not use drugs.     Allergies:  Quinolones    Current Meds:  Current Outpatient Medications:     cholecalciferol (Vitamin D-3) 5,000 Units tablet, , Disp: , Rfl:     ferrous sulfate, 325 mg ferrous sulfate, tablet, Take 1 tablet by mouth once daily with breakfast., Disp: , Rfl:     lisinopril 20 mg tablet, 1 tablet (20 mg)., Disp: , Rfl:     multivit with minerals/lutein (MULTIVITAMIN 50 PLUS ORAL), , Disp: , Rfl:     NIFEdipine XL 30 mg 24 hr tablet, 1 tablet (30 mg)., Disp: , Rfl:     propranolol (Inderal) 20 mg tablet, Take 1 tablet (20 mg) by mouth., Disp: , Rfl:     psyllium (Metamucil) 0.52 gram capsule, Psyllium Husk (Metamucil) 0.52 gram Capsule Active 1 CAP PO As Directed October 23rd, 2017 12:00am, Disp: , Rfl:     sodium chloride (Ocean) 0.65 % nasal spray, Administer 2 sprays into each nostril if needed for congestion (Encourage use at least 6-10 times per day) for up to 14 days., Disp: 30 mL, Rfl: 0    SUMAtriptan (Imitrex) 100 mg tablet, Take 1 tablet (100 mg) by mouth., Disp: , Rfl:     traMADol (Ultram) 50 mg tablet, Take 1 tablet (50 mg) by mouth every 6 hours if needed for severe pain (7 - 10). (Patient not taking: Reported on 5/14/2024), Disp: 20 tablet, Rfl: 0    Vitals:  Visit Vitals  Smoking Status Never      Physical Exam:  Nose: On external exam there are neither lesions nor asymmetry of the nasal tip/dorsum. On anterior rhinoscopy, visualization posteriorly is limited on anterior examination. For this reason, to adequately evaluate posteriorly for masses, source of epistaxis, polypoid disease, debridement, and/or signs of infections, nasal  endoscopy is indicated.  (Please see procedure below.)    SINONASAL ENDOSCOPY WITH DEBRIDEMENT (CPT 40578-X-72):  Due to the patient's chronic sinusitis/chronic rhinitis, sinonasal endoscopy with debridement is indicated. After discussion of risks and benefits, and topical decongestion and anesthesia, an endoscope was used to perform nasal endoscopy with debridement.  A timeout identifying the patient, the procedure, and any concerns was performed prior to beginning the procedure.    Findings:  Examination of the left nasal cavity revealed no significant debris within his sinuses.  The maxillary, ethmoid, sphenoid, and frontal were all widely patent and contained generally edematous mucosa.  Examination of the right nasal cavity revealed crusting and debris throughout the nasal cavity extending into the ethmoid cavity that was removed with a combination of alligator and suction.  Following this, the maxillary, ethmoid, sphenoid, and frontal were all widely patent.  The sinuses contain generalized mucosal edema.  He tolerated the procedure very well.    Provider Impressions:  1.  Chronic sinusitis s/p bilateral endoscopic sinus surgery on 5/10/24  2.  Nasal airway obstruction s/p endoscopic septoplasty and bilateral inferior turbinate reductions on 5/10/24  3.  Rhinorrhea  4.  Facial pain and pressure, headaches  5.  Decreased sense of smell  6.  Throat clearing, coughing, dysphonia  7.  Tinnitus  8.  Factor VII deficiency    Discussion:  Dean Rueda and I discussed his exam and symptoms.  In the short-term, given the mucosal edema that I saw, I recommended a prednisone taper.  We discussed side effects and I recommended discontinuation for any issues.  We also discussed daily intranasal medical therapy but in the short-term he was comfortable deferring this and we can make a decision about long-term medical therapy in about 6 weeks and I asked him to follow-up at that time.  I suggested utilization of rinses  about once per day moving forward.  If he has difficulty using them consistently he can certainly use saline spray throughout the day.  All questions were answered.    Signature:  Scribe Attestation  By signing my name below, I, Cassandra Manolo , Viry   attest that this documentation has been prepared under the direction and in the presence of Liam Nowak MD.

## 2024-06-04 ENCOUNTER — APPOINTMENT (OUTPATIENT)
Dept: HEMATOLOGY/ONCOLOGY | Facility: HOSPITAL | Age: 59
End: 2024-06-04
Payer: COMMERCIAL

## 2024-07-03 ENCOUNTER — APPOINTMENT (OUTPATIENT)
Dept: OTOLARYNGOLOGY | Facility: CLINIC | Age: 59
End: 2024-07-03
Payer: COMMERCIAL

## 2024-07-03 VITALS — BODY MASS INDEX: 24.94 KG/M2 | WEIGHT: 188.2 LBS | HEIGHT: 73 IN

## 2024-07-03 DIAGNOSIS — J32.1 CHRONIC FRONTAL SINUSITIS: ICD-10-CM

## 2024-07-03 DIAGNOSIS — J32.2 CHRONIC ETHMOIDAL SINUSITIS: Primary | ICD-10-CM

## 2024-07-03 DIAGNOSIS — R43.8 DECREASED SENSE OF SMELL: ICD-10-CM

## 2024-07-03 PROCEDURE — 31231 NASAL ENDOSCOPY DX: CPT | Performed by: OTOLARYNGOLOGY

## 2024-07-03 PROCEDURE — 99024 POSTOP FOLLOW-UP VISIT: CPT | Performed by: OTOLARYNGOLOGY

## 2024-07-03 ASSESSMENT — PATIENT HEALTH QUESTIONNAIRE - PHQ9
SUM OF ALL RESPONSES TO PHQ9 QUESTIONS 1 AND 2: 0
2. FEELING DOWN, DEPRESSED OR HOPELESS: NOT AT ALL
1. LITTLE INTEREST OR PLEASURE IN DOING THINGS: NOT AT ALL

## 2024-07-03 ASSESSMENT — PAIN SCALES - GENERAL: PAINLEVEL: 0-NO PAIN

## 2024-07-03 NOTE — PROGRESS NOTES
Chief Complaint:  1.  Chronic sinusitis s/p bilateral endoscopic sinus surgery on 5/10/24  2.  Nasal airway obstruction s/p endoscopic septoplasty and bilateral inferior turbinate reductions on 5/10/24  3.  Rhinorrhea  4.  Facial pain and pressure, headaches  5.  Decreased sense of smell  6.  Throat clearing, coughing, dysphonia  7.  Tinnitus  8.  Factor VII deficiency    History Of Present Illness:    Dean Rueda presents since last being seen 5/22/2024.    In the interval time, he has done well.  He is not using any intranasal medical therapy currently.  He completed a course of prednisone immediately following his last evaluation.  His symptoms are significantly improved although he continues to have a decreased sense of smell.  He can smell things when they are close but has more difficulty when things are far away.    Main Symptoms:  Patient does not have anterior nasal drainage.     Patient does not have  posterior nasal drainage.    Patient does not have nasal airway obstruction.   Patient does not have  facial pain.    Patient does not have  facial pressure.    Patient has decreased sense of smell.  Can smell when things are close.    Associated Symptoms:   Patient has  headaches.   Improved.    Patient does not have throat clearing.    Patient does not have coughing.    Patient does not have dysphonia.   Patient does not have nasal bleeding.     Medications currently on for sinonasal symptoms:   None.    Active Problems:  Patient Active Problem List   Diagnosis    Deviated nasal septum    Chronic ethmoidal sinusitis    Primary hypertension    History of migraine headaches    Osteoarthritis of multiple joints      Past Medical History:  He has a past medical history of Arthritis, BMI 26.0-26.9,adult, Chronic ethmoidal sinusitis, Chronic headaches, Clotting disorder (Multi), Coagulation disorder (Multi), Deviated nasal septum, Factor deficiency, coagulation (Multi), Gilbert's syndrome, Hypertension,  Pre-procedure lab exam, and Rotator cuff arthropathy of both shoulders.    Surgical History:  He has a past surgical history that includes Anal sphincterotomy (2015); Colonoscopy; Carpal tunnel release (Bilateral, 2004); Cataract extraction (Bilateral, 2020); and Retinal detachment surgery (Bilateral, 2019).     Family History:  Family History   Problem Relation Name Age of Onset    Hyperlipidemia Mother      Hypertension Mother      Heart disease Mother      Irritable bowel syndrome Mother      Other (Pacemaker) Mother      Hyperlipidemia Father      Colon cancer Father      Melanoma Father      Anemia Father      Other (fissurectomy) Sister      Osteoarthritis Sister      Hyperlipidemia Brother      Hypertension Brother      Heart disease Brother      Other (AAA) Brother         Social History:  He reports that he has never smoked. He has never used smokeless tobacco. He reports that he does not currently use alcohol. He reports that he does not use drugs.     Allergies:  Quinolones    Current Meds:    Current Outpatient Medications:     cholecalciferol (Vitamin D-3) 5,000 Units tablet, , Disp: , Rfl:     ferrous sulfate, 325 mg ferrous sulfate, tablet, Take 1 tablet by mouth once daily with breakfast., Disp: , Rfl:     lisinopril 20 mg tablet, 1 tablet (20 mg)., Disp: , Rfl:     multivit with minerals/lutein (MULTIVITAMIN 50 PLUS ORAL), , Disp: , Rfl:     NIFEdipine XL 30 mg 24 hr tablet, 1 tablet (30 mg)., Disp: , Rfl:     predniSONE (Deltasone) 10 mg tablet, 30mg PO Qday for 3 days, 20mg PO Qday for 3 days, 10 mg PO Qday for 4 days, Disp: 19 tablet, Rfl: 0    propranolol (Inderal) 20 mg tablet, Take 1 tablet (20 mg) by mouth., Disp: , Rfl:     psyllium (Metamucil) 0.52 gram capsule, Psyllium Husk (Metamucil) 0.52 gram Capsule Active 1 CAP PO As Directed October 23rd, 2017 12:00am, Disp: , Rfl:     SUMAtriptan (Imitrex) 100 mg tablet, Take 1 tablet (100 mg) by mouth., Disp: , Rfl:     traMADol (Ultram) 50 mg  tablet, Take 1 tablet (50 mg) by mouth every 6 hours if needed for severe pain (7 - 10). (Patient not taking: Reported on 5/14/2024), Disp: 20 tablet, Rfl: 0    Vitals:  Visit Vitals  Smoking Status Never        Physical Exam:  Nose:  On external exam there are neither lesions nor asymmetry of the nasal tip/dorsum.  On anterior rhinoscopy, visualization posteriorly is limited on anterior examination.  For this reason, to adequately evaluate posteriorly for masses, source of epistaxis, polypoid disease, debridement, and/or signs of infections, nasal endoscopy is indicated.  (Please see procedure below.)    SINONASAL ENDOSCOPY (CPT 47562-93):  To better evaluate the patient's symptoms, sinonasal endoscopy is indicated.  After discussion of risks and benefits, and topical decongestion and anesthesia, an endoscope was used to perform nasal endoscopy on each side.  A time out identifying the patient, the procedure, the location of the procedure and any concerns was performed prior to beginning the procedure.    Findings:  Examination of each nasal cavity revealed an intact septum.  Each maxillary, sphenoid, ethmoid, and frontal were widely patent with normal appearing mucosa.  No pus or polyps at either middle meatus or sphenoethmoid recess.    Provider Impressions:  1.  Chronic sinusitis s/p bilateral endoscopic sinus surgery on 5/10/24  2.  Nasal airway obstruction s/p endoscopic septoplasty and bilateral inferior turbinate reductions on 5/10/24  3.  Decree sense of smell  4.  Tinnitus  5.  Factor VII deficiency    Discussion:  Dean Rueda appeared well on examination today.  We discussed options in regard to his decreased sense of smell and he was comfortable with observation.  I would like to see him back in about 6 months.  All questions were answered.    Signature:  Scribe Attestation  By signing my name below, IQuinton, Viry   attest that this documentation has been prepared under the direction  and in the presence of Liam Nowak MD.

## 2024-11-08 NOTE — PROGRESS NOTES
[facility]    Chief Complaint:  1.  Chronic sinusitis s/p bilateral endoscopic sinus surgery on 5/10/24  2.  Nasal airway obstruction s/p endoscopic septoplasty and bilateral inferior turbinate reductions on 5/10/24  3.  Decreased sense of smell  4.  Tinnitus  5.  Factor VII deficiency    History Of Present Illness:    Dean Rueda presents since last being seen July 3, 2024.    He has been doing well in regard to his sinonasal symptoms.  He is not currently using intranasal medical therapy.  He can get some minor intermittent nasal congestion that comes and goes.    Main Symptoms:  Patient does not have anterior nasal drainage.  Patient does not have posterior nasal drainage.  Patient has nasal airway obstruction.  Patient does not have facial pain.  Patient does not have facial pressure.  Patient does not have decreased sense of smell.   Associated Symptoms:  Patient does not have headaches.  Patient does not have throat clearing.  Patient does not have coughing.  Patient does not have dysphonia.  Patient does not have nasal bleeding.    Medications currently on for sinonasal symptoms:  None    Active Problems:  Patient Active Problem List   Diagnosis    Deviated nasal septum    Chronic ethmoidal sinusitis    Primary hypertension    History of migraine headaches    Osteoarthritis of multiple joints     Past Medical History:  He has a past medical history of Arthritis, BMI 26.0-26.9,adult, Chronic ethmoidal sinusitis, Chronic headaches, Clotting disorder (Multi), Coagulation disorder (Multi), Deviated nasal septum, Factor deficiency, coagulation (Multi), Gilbert's syndrome, Hypertension, Pre-procedure lab exam, and Rotator cuff arthropathy of both shoulders.    Surgical History:  He has a past surgical history that includes Anal sphincterotomy (2015); Colonoscopy; Carpal tunnel release (Bilateral, 2004); Cataract extraction (Bilateral, 2020); and Retinal detachment surgery  "(Bilateral, 2019).    Family History:  Family History   Problem Relation Name Age of Onset    Hyperlipidemia Mother      Hypertension Mother      Heart disease Mother      Irritable bowel syndrome Mother      Other (Pacemaker) Mother      Hyperlipidemia Father      Colon cancer Father      Melanoma Father      Anemia Father      Other (fissurectomy) Sister      Osteoarthritis Sister      Hyperlipidemia Brother      Hypertension Brother      Heart disease Brother      Other (AAA) Brother       Social History:  He reports that he has never smoked. He has never used smokeless tobacco. He reports that he does not currently use alcohol. He reports that he does not use drugs.    Allergies:  Quinolones    Current Meds:  Current Outpatient Medications:     cholecalciferol (Vitamin D-3) 5,000 Units tablet, , Disp: , Rfl:     ferrous sulfate, 325 mg ferrous sulfate, tablet, Take 1 tablet (325 mg) by mouth once daily with breakfast., Disp: , Rfl:     lisinopril 20 mg tablet, 1 tablet (20 mg)., Disp: , Rfl:     multivit with minerals/lutein (MULTIVITAMIN 50 PLUS ORAL), , Disp: , Rfl:     NIFEdipine XL 30 mg 24 hr tablet, 1 tablet (30 mg)., Disp: , Rfl:     propranolol (Inderal) 20 mg tablet, Take 1 tablet (20 mg) by mouth., Disp: , Rfl:     psyllium (Metamucil) 0.52 gram capsule, Psyllium Husk (Metamucil) 0.52 gram Capsule Active 1 CAP PO As Directed October 23rd, 2017 12:00am, Disp: , Rfl:     SUMAtriptan (Imitrex) 100 mg tablet, Take 1 tablet (100 mg) by mouth., Disp: , Rfl:     predniSONE (Deltasone) 10 mg tablet, 30mg PO Qday for 3 days, 20mg PO Qday for 3 days, 10 mg PO Qday for 4 days (Patient not taking: Reported on 11/13/2024), Disp: 19 tablet, Rfl: 0    traMADol (Ultram) 50 mg tablet, Take 1 tablet (50 mg) by mouth every 6 hours if needed for severe pain (7 - 10). (Patient not taking: Reported on 5/14/2024), Disp: 20 tablet, Rfl: 0    Vitals:  Visit Vitals  Ht 1.854 m (6' 1\")   Wt 86.4 kg (190 lb 8 oz)   BMI 25.13 " kg/m²   Smoking Status Never   BSA 2.11 m²     Physical Exam:  Nose: On external exam there are neither lesions nor asymmetry of the nasal tip/dorsum. On anterior rhinoscopy, visualization posteriorly is limited on anterior examination. For this reason, to adequately evaluate posteriorly for masses, source of epistaxis, polypoid disease, debridement, and/or signs of infections, nasal endoscopy is indicated.  (Please see procedure below.)    SINONASAL ENDOSCOPY (CPT 79311): To better evaluate the patient's symptoms, sinonasal endoscopy is indicated.  After discussion of risks and benefits, and topical decongestion and anesthesia, an endoscope was used to perform nasal endoscopy on each side.  A time out identifying the patient, the procedure, the location of the procedure and any concerns was performed prior to beginning the procedure.    Findings:  Bilateral each maxillary, ethmoid, sphenoid, and frontal appeared patent with normal appearing mucosa.  Each middle meatus and sphenoethmoid recess were normal without pus or polyps.    Provider Impressions:  1.  Chronic sinusitis s/p bilateral endoscopic sinus surgery on 5/10/24  2.  Nasal airway obstruction s/p endoscopic septoplasty and bilateral inferior turbinate reductions on 5/10/24  3.  Tinnitus  4.  Factor VII deficiency    Discussion:  Dean Rueda appeared very well on examination today.  I reassured him that he is healed very well from the procedure.  He has previously used topical steroid therapy so if his congestion becomes more persistent he can certainly resume that therapy.  The more consistently he would use it the better.    I recommended follow-up with me as needed for refills or with any new concerns or symptoms.  All questions were answered.    Scribe Attestation  By signing my name below, I, Viry Wagner, attest that this documentation has been prepared under the direction and in the presence of Liam Nowak  MD.    Signature:  Liam Nowak MD

## 2024-11-13 ENCOUNTER — APPOINTMENT (OUTPATIENT)
Dept: OTOLARYNGOLOGY | Facility: CLINIC | Age: 59
End: 2024-11-13
Payer: COMMERCIAL

## 2024-11-13 VITALS — BODY MASS INDEX: 25.25 KG/M2 | WEIGHT: 190.5 LBS | HEIGHT: 73 IN

## 2024-11-13 DIAGNOSIS — J32.2 CHRONIC ETHMOIDAL SINUSITIS: Primary | ICD-10-CM

## 2024-11-13 DIAGNOSIS — J32.1 CHRONIC FRONTAL SINUSITIS: ICD-10-CM

## 2024-11-13 PROCEDURE — 3008F BODY MASS INDEX DOCD: CPT | Performed by: OTOLARYNGOLOGY

## 2024-11-13 PROCEDURE — 31231 NASAL ENDOSCOPY DX: CPT | Performed by: OTOLARYNGOLOGY

## 2024-11-13 PROCEDURE — 99213 OFFICE O/P EST LOW 20 MIN: CPT | Performed by: OTOLARYNGOLOGY

## 2024-11-13 ASSESSMENT — PAIN SCALES - GENERAL: PAINLEVEL_OUTOF10: 0-NO PAIN

## 2024-12-27 ENCOUNTER — APPOINTMENT (OUTPATIENT)
Dept: NEUROSURGERY | Facility: CLINIC | Age: 59
End: 2024-12-27
Payer: COMMERCIAL

## 2024-12-27 VITALS
WEIGHT: 199.4 LBS | HEIGHT: 73 IN | SYSTOLIC BLOOD PRESSURE: 118 MMHG | DIASTOLIC BLOOD PRESSURE: 70 MMHG | BODY MASS INDEX: 26.43 KG/M2

## 2024-12-27 DIAGNOSIS — M54.16 LUMBAR RADICULOPATHY: Primary | ICD-10-CM

## 2024-12-27 PROCEDURE — 99205 OFFICE O/P NEW HI 60 MIN: CPT | Performed by: STUDENT IN AN ORGANIZED HEALTH CARE EDUCATION/TRAINING PROGRAM

## 2024-12-27 PROCEDURE — 3008F BODY MASS INDEX DOCD: CPT | Performed by: STUDENT IN AN ORGANIZED HEALTH CARE EDUCATION/TRAINING PROGRAM

## 2024-12-27 PROCEDURE — 1036F TOBACCO NON-USER: CPT | Performed by: STUDENT IN AN ORGANIZED HEALTH CARE EDUCATION/TRAINING PROGRAM

## 2024-12-27 PROCEDURE — 3074F SYST BP LT 130 MM HG: CPT | Performed by: STUDENT IN AN ORGANIZED HEALTH CARE EDUCATION/TRAINING PROGRAM

## 2024-12-27 PROCEDURE — 3078F DIAST BP <80 MM HG: CPT | Performed by: STUDENT IN AN ORGANIZED HEALTH CARE EDUCATION/TRAINING PROGRAM

## 2024-12-27 RX ORDER — PREGABALIN 50 MG/1
50 CAPSULE ORAL 2 TIMES DAILY
Qty: 60 CAPSULE | Refills: 0 | Status: SHIPPED | OUTPATIENT
Start: 2024-12-27 | End: 2025-01-26

## 2024-12-27 NOTE — PROGRESS NOTES
Dean Rueda is a 59 y.o. year old male p/w lumbar radiculopathy.    14/14 systems reviewed and negative other than what is listed in the history of present illness        Past Medical History:   Diagnosis Date    Arthritis     BMI 26.0-26.9,adult     Chronic ethmoidal sinusitis     Chronic headaches     Clotting disorder (Multi)     Coagulation disorder (Multi)     Deviated nasal septum     Factor deficiency, coagulation (Multi)     VII    Gilbert's syndrome     Hypertension     Pre-procedure lab exam     PT 15.2  Factor VII 28    Rotator cuff arthropathy of both shoulders        Past Surgical History:   Procedure Laterality Date    ANAL SPHINCTEROTOMY  2015    fissurectomy second time 2016    CARPAL TUNNEL RELEASE Bilateral 2004    CATARACT EXTRACTION Bilateral 2020    COLONOSCOPY      RETINAL DETACHMENT SURGERY Bilateral 2019    vitreous           Current Outpatient Medications:     cholecalciferol (Vitamin D-3) 5,000 Units tablet, , Disp: , Rfl:     ferrous sulfate, 325 mg ferrous sulfate, tablet, Take 1 tablet (325 mg) by mouth once daily with breakfast., Disp: , Rfl:     lisinopril 20 mg tablet, 1 tablet (20 mg)., Disp: , Rfl:     multivit with minerals/lutein (MULTIVITAMIN 50 PLUS ORAL), , Disp: , Rfl:     NIFEdipine XL 30 mg 24 hr tablet, 1 tablet (30 mg)., Disp: , Rfl:     predniSONE (Deltasone) 10 mg tablet, 30mg PO Qday for 3 days, 20mg PO Qday for 3 days, 10 mg PO Qday for 4 days (Patient not taking: Reported on 11/13/2024), Disp: 19 tablet, Rfl: 0    propranolol (Inderal) 20 mg tablet, Take 1 tablet (20 mg) by mouth., Disp: , Rfl:     psyllium (Metamucil) 0.52 gram capsule, Psyllium Husk (Metamucil) 0.52 gram Capsule Active 1 CAP PO As Directed October 23rd, 2017 12:00am, Disp: , Rfl:     SUMAtriptan (Imitrex) 100 mg tablet, Take 1 tablet (100 mg) by mouth., Disp: , Rfl:     traMADol (Ultram) 50 mg tablet, Take 1 tablet (50 mg) by mouth every 6 hours if needed for severe pain (7 - 10). (Patient  not taking: Reported on 5/14/2024), Disp: 20 tablet, Rfl: 0      Vitals:    12/27/24 1058   BP: 118/70     Objective   General: Well developed, awake/alert/oriented x3, no distress, alert and cooperative  Skin: Warm and dry, no lesions, no rashes  ENMT: Mucous membranes moist, no apparent injury, no lesions seen  Head/Neck: Neck Supple, no apparent injury  Respiratory/Thorax: Normal breath sounds with good chest expansion, thorax symmetric  Cardiovascular: No pitting edema, no JVD    Motor Strength: 5/5 Throughout all extremities    Muscle Bulk: Normal and symmetric in all extremities    Posture:   -- Cervical: Normal  -- Thoracic: Normal  -- Lumbar : Normal  Paraspinal muscle spasm/tenderness absent.     Sensation: intact to light touch     Relevant Results    MRI C-spine: no central stenosis   MRI L-spine: right L1-2 lateral recess stenosis, left L2-3 lateral recess stenosis, L4-5 bilateral lateral recess stenosis, no central stenosis anywhere  Problem List Items Addressed This Visit    None         Assessment/Plan       Mr. Dean Rueda is a very nice 59 y.o. year old male presenting today with lower back pain that is radiating to his right lower extremity. This has been exacerbated over the last 3 months. The patient was previously treated with oral steroids for many years for flairs. Recently, he tried medications ( Gabapentin), physical therapy and pain management with moderate improvement. He denies weakness/ numbness/ perianal anesthesia/bowel and bladder incontinence.     We reviewed Mri scans together today showing no central stenosis, but some lateral recess stenosis, worst at L4-5 (R>L). On examination, he is moving all four limbs with full strength and has intact neurological examination. He has been seeing a neurologist who ordered BUE and BLE EMGs which have shown right upper peripheral nerve issues addressed by antecubital and carpal tunnel decompressions and bilateral lower extremity mild S1  radiculopathy.    In view of his current symptoms and Mri findings, I would like to start him on a trial of conservative management options. So I have started him on Lyrica and advised him to continue pain management and physical therapy. He has an appointment with pain management in January. I have also referred him for aqua therapy. If his medical management options fails he can follow up with me to discuss further about surgery.      Patient is to follow up with me as needed.    Tere Huertas MD    of Neurosurgery   [facility]   [facility]   Office: [phone]  Fax: [phone]      Scribe Attestation  By signing my name below, IRanda , Scrneftali   attest that this documentation has been prepared under the direction and in the presence of Tere Huertas MD.

## (undated) DEVICE — SUTURE, CHROMIC GUT, 4-0, SH 27

## (undated) DEVICE — TUBE, SALEM SUMP, 16 FR X 48IN, ENFIT

## (undated) DEVICE — Device

## (undated) DEVICE — TRACKER, STINGRAY, NON-INVASIVE, AXIEM STEALTH NAVIGATION

## (undated) DEVICE — REST, HEAD, BAGEL, 9 IN

## (undated) DEVICE — TRAP, SPECIMEN, NEPTUNE QUICK COLLECTOR

## (undated) DEVICE — COVER, TABLE, 44 X 75 IN, DISPOSABLE, LF, STERILE

## (undated) DEVICE — PAD, GROUNDING, ELECTROSURGICAL, W/15 FT CABLE, POLYHESIVE II, ADULT, LF

## (undated) DEVICE — BLADE, SHAVER, TRICUT, STRAIGHT SHOT MAGNUM, 4 MM, STERILE

## (undated) DEVICE — COVER, CART, 45 X 27 X 48 IN, CLEAR

## (undated) DEVICE — DRESSING, HEMOPORE, 8CM

## (undated) DEVICE — ELECTRODE, ELECTROSURGICAL, COAGULATOR, W/SUCTION, HANDSWITCHING, 8 FR, 6 IN

## (undated) DEVICE — TOWEL, SURGICAL, NEURO, O/R, 16 X 26, BLUE, STERILE

## (undated) DEVICE — BOWL, BASIN, 32 OZ, STERILE

## (undated) DEVICE — TRACKER, INSTRUMENT

## (undated) DEVICE — DRAPE, FLUID WARMER